# Patient Record
Sex: FEMALE | Race: WHITE | NOT HISPANIC OR LATINO | URBAN - METROPOLITAN AREA
[De-identification: names, ages, dates, MRNs, and addresses within clinical notes are randomized per-mention and may not be internally consistent; named-entity substitution may affect disease eponyms.]

---

## 2017-06-26 ENCOUNTER — EMERGENCY (EMERGENCY)
Facility: HOSPITAL | Age: 77
LOS: 1 days | Discharge: HOME | End: 2017-06-26
Attending: EMERGENCY MEDICINE | Admitting: EMERGENCY MEDICINE
Payer: MEDICARE

## 2017-06-26 VITALS
TEMPERATURE: 98 F | HEART RATE: 67 BPM | OXYGEN SATURATION: 98 % | DIASTOLIC BLOOD PRESSURE: 78 MMHG | SYSTOLIC BLOOD PRESSURE: 178 MMHG | WEIGHT: 169.98 LBS | RESPIRATION RATE: 11 BRPM

## 2017-06-26 VITALS — SYSTOLIC BLOOD PRESSURE: 177 MMHG | DIASTOLIC BLOOD PRESSURE: 83 MMHG

## 2017-06-26 DIAGNOSIS — F17.200 NICOTINE DEPENDENCE, UNSPECIFIED, UNCOMPLICATED: ICD-10-CM

## 2017-06-26 DIAGNOSIS — Z88.5 ALLERGY STATUS TO NARCOTIC AGENT: ICD-10-CM

## 2017-06-26 DIAGNOSIS — I10 ESSENTIAL (PRIMARY) HYPERTENSION: ICD-10-CM

## 2017-06-26 DIAGNOSIS — Z79.84 LONG TERM (CURRENT) USE OF ORAL HYPOGLYCEMIC DRUGS: ICD-10-CM

## 2017-06-26 DIAGNOSIS — E11.9 TYPE 2 DIABETES MELLITUS WITHOUT COMPLICATIONS: ICD-10-CM

## 2017-06-26 DIAGNOSIS — E78.5 HYPERLIPIDEMIA, UNSPECIFIED: ICD-10-CM

## 2017-06-26 PROCEDURE — 99283 EMERGENCY DEPT VISIT LOW MDM: CPT

## 2017-06-26 PROCEDURE — 99282 EMERGENCY DEPT VISIT SF MDM: CPT

## 2017-06-26 RX ORDER — LOSARTAN POTASSIUM 100 MG/1
1 TABLET, FILM COATED ORAL
Qty: 0 | Refills: 0 | COMMUNITY

## 2017-06-26 NOTE — ED PROVIDER NOTE - MEDICAL DECISION MAKING DETAILS
75yo female with hypertension, supportive care, follow up tomorrow with pmd, educate about taking bp and to stop smoking

## 2017-06-26 NOTE — ED ADULT NURSE NOTE - OBJECTIVE STATEMENT
received pt in FT Pt A&O states BP was high @ home Spoke to Dr Garcia & was instructed to take an additional dose of BP medication Pt w/ no complaints Pt seen by Dr Huynh Pt d/c'd per order

## 2017-06-26 NOTE — ED PROVIDER NOTE - OBJECTIVE STATEMENT
77yo female who presents with elevated BP. pt states she has not had change in her meds, and today felt pressure in her head, BP was up to 190, she spoke to her pmd who told her to take an extra cozaar and her bp came down to 170, she was seen at [facility] and told to come to the ER. pt denies headache, dizziness, chest pain no other copmlaints

## 2017-06-29 ENCOUNTER — EMERGENCY (EMERGENCY)
Facility: HOSPITAL | Age: 77
LOS: 1 days | Discharge: HOME | End: 2017-06-29
Attending: EMERGENCY MEDICINE | Admitting: EMERGENCY MEDICINE
Payer: MEDICARE

## 2017-06-29 VITALS
RESPIRATION RATE: 16 BRPM | OXYGEN SATURATION: 99 % | WEIGHT: 125 LBS | SYSTOLIC BLOOD PRESSURE: 182 MMHG | HEART RATE: 66 BPM | TEMPERATURE: 98 F | DIASTOLIC BLOOD PRESSURE: 82 MMHG

## 2017-06-29 VITALS
DIASTOLIC BLOOD PRESSURE: 82 MMHG | RESPIRATION RATE: 15 BRPM | SYSTOLIC BLOOD PRESSURE: 160 MMHG | HEART RATE: 66 BPM | OXYGEN SATURATION: 98 %

## 2017-06-29 DIAGNOSIS — I16.0 HYPERTENSIVE URGENCY: ICD-10-CM

## 2017-06-29 DIAGNOSIS — I10 ESSENTIAL (PRIMARY) HYPERTENSION: ICD-10-CM

## 2017-06-29 DIAGNOSIS — Z91.041 RADIOGRAPHIC DYE ALLERGY STATUS: ICD-10-CM

## 2017-06-29 DIAGNOSIS — E11.9 TYPE 2 DIABETES MELLITUS WITHOUT COMPLICATIONS: ICD-10-CM

## 2017-06-29 DIAGNOSIS — Z88.5 ALLERGY STATUS TO NARCOTIC AGENT: ICD-10-CM

## 2017-06-29 DIAGNOSIS — E78.5 HYPERLIPIDEMIA, UNSPECIFIED: ICD-10-CM

## 2017-06-29 DIAGNOSIS — F17.200 NICOTINE DEPENDENCE, UNSPECIFIED, UNCOMPLICATED: ICD-10-CM

## 2017-06-29 DIAGNOSIS — Z79.4 LONG TERM (CURRENT) USE OF INSULIN: ICD-10-CM

## 2017-06-29 LAB
ALBUMIN SERPL ELPH-MCNC: 3.4 G/DL — SIGNIFICANT CHANGE UP (ref 3.3–5)
ALP SERPL-CCNC: 81 U/L — SIGNIFICANT CHANGE UP (ref 40–120)
ALT FLD-CCNC: 24 U/L — SIGNIFICANT CHANGE UP (ref 12–78)
ANION GAP SERPL CALC-SCNC: 7 MMOL/L — SIGNIFICANT CHANGE UP (ref 5–17)
AST SERPL-CCNC: 19 U/L — SIGNIFICANT CHANGE UP (ref 15–37)
BASOPHILS # BLD AUTO: 0.1 K/UL — SIGNIFICANT CHANGE UP (ref 0–0.2)
BASOPHILS NFR BLD AUTO: 0.8 % — SIGNIFICANT CHANGE UP (ref 0–2)
BILIRUB SERPL-MCNC: 0.4 MG/DL — SIGNIFICANT CHANGE UP (ref 0.2–1.2)
BUN SERPL-MCNC: 12 MG/DL — SIGNIFICANT CHANGE UP (ref 7–23)
CALCIUM SERPL-MCNC: 9 MG/DL — SIGNIFICANT CHANGE UP (ref 8.5–10.1)
CHLORIDE SERPL-SCNC: 99 MMOL/L — SIGNIFICANT CHANGE UP (ref 96–108)
CK MB BLD-MCNC: 1.2 % — SIGNIFICANT CHANGE UP (ref 0–3.5)
CK MB CFR SERPL CALC: 0.8 NG/ML — SIGNIFICANT CHANGE UP (ref 0–3.6)
CK SERPL-CCNC: 67 U/L — SIGNIFICANT CHANGE UP (ref 26–192)
CO2 SERPL-SCNC: 31 MMOL/L — SIGNIFICANT CHANGE UP (ref 22–31)
CREAT SERPL-MCNC: 0.62 MG/DL — SIGNIFICANT CHANGE UP (ref 0.5–1.3)
EOSINOPHIL # BLD AUTO: 0.3 K/UL — SIGNIFICANT CHANGE UP (ref 0–0.5)
EOSINOPHIL NFR BLD AUTO: 2.3 % — SIGNIFICANT CHANGE UP (ref 0–6)
GLUCOSE SERPL-MCNC: 208 MG/DL — HIGH (ref 70–99)
HCT VFR BLD CALC: 40.9 % — SIGNIFICANT CHANGE UP (ref 34.5–45)
HGB BLD-MCNC: 13.4 G/DL — SIGNIFICANT CHANGE UP (ref 11.5–15.5)
LYMPHOCYTES # BLD AUTO: 1.8 K/UL — SIGNIFICANT CHANGE UP (ref 1–3.3)
LYMPHOCYTES # BLD AUTO: 14.7 % — SIGNIFICANT CHANGE UP (ref 13–44)
MCHC RBC-ENTMCNC: 27.7 PG — SIGNIFICANT CHANGE UP (ref 27–34)
MCHC RBC-ENTMCNC: 32.8 GM/DL — SIGNIFICANT CHANGE UP (ref 32–36)
MCV RBC AUTO: 84.5 FL — SIGNIFICANT CHANGE UP (ref 80–100)
MONOCYTES # BLD AUTO: 1.3 K/UL — HIGH (ref 0–0.9)
MONOCYTES NFR BLD AUTO: 10.2 % — HIGH (ref 1–9)
NEUTROPHILS # BLD AUTO: 8.9 K/UL — HIGH (ref 1.8–7.4)
NEUTROPHILS NFR BLD AUTO: 72 % — SIGNIFICANT CHANGE UP (ref 43–77)
PLATELET # BLD AUTO: 396 K/UL — SIGNIFICANT CHANGE UP (ref 150–400)
POTASSIUM SERPL-MCNC: 4.3 MMOL/L — SIGNIFICANT CHANGE UP (ref 3.5–5.3)
POTASSIUM SERPL-SCNC: 4.3 MMOL/L — SIGNIFICANT CHANGE UP (ref 3.5–5.3)
PROT SERPL-MCNC: 7.6 G/DL — SIGNIFICANT CHANGE UP (ref 6–8.3)
RBC # BLD: 4.84 M/UL — SIGNIFICANT CHANGE UP (ref 3.8–5.2)
RBC # FLD: 13 % — SIGNIFICANT CHANGE UP (ref 10.3–14.5)
SODIUM SERPL-SCNC: 137 MMOL/L — SIGNIFICANT CHANGE UP (ref 135–145)
TROPONIN I SERPL-MCNC: <.015 NG/ML — SIGNIFICANT CHANGE UP (ref 0.01–0.04)
WBC # BLD: 12.4 K/UL — HIGH (ref 3.8–10.5)
WBC # FLD AUTO: 12.4 K/UL — HIGH (ref 3.8–10.5)

## 2017-06-29 PROCEDURE — 82553 CREATINE MB FRACTION: CPT

## 2017-06-29 PROCEDURE — 70450 CT HEAD/BRAIN W/O DYE: CPT | Mod: 26

## 2017-06-29 PROCEDURE — 99284 EMERGENCY DEPT VISIT MOD MDM: CPT | Mod: 25

## 2017-06-29 PROCEDURE — 36415 COLL VENOUS BLD VENIPUNCTURE: CPT

## 2017-06-29 PROCEDURE — 70450 CT HEAD/BRAIN W/O DYE: CPT

## 2017-06-29 PROCEDURE — 84484 ASSAY OF TROPONIN QUANT: CPT

## 2017-06-29 PROCEDURE — 93005 ELECTROCARDIOGRAM TRACING: CPT

## 2017-06-29 PROCEDURE — 82550 ASSAY OF CK (CPK): CPT

## 2017-06-29 PROCEDURE — 80053 COMPREHEN METABOLIC PANEL: CPT

## 2017-06-29 PROCEDURE — 85027 COMPLETE CBC AUTOMATED: CPT

## 2017-06-29 PROCEDURE — 99285 EMERGENCY DEPT VISIT HI MDM: CPT | Mod: 25

## 2017-06-29 RX ORDER — METFORMIN HYDROCHLORIDE 850 MG/1
1 TABLET ORAL
Qty: 0 | Refills: 0 | COMMUNITY

## 2017-06-29 RX ORDER — SODIUM CHLORIDE 9 MG/ML
3 INJECTION INTRAMUSCULAR; INTRAVENOUS; SUBCUTANEOUS ONCE
Qty: 0 | Refills: 0 | Status: COMPLETED | OUTPATIENT
Start: 2017-06-29 | End: 2017-06-29

## 2017-06-29 RX ORDER — RANITIDINE HYDROCHLORIDE 150 MG/1
0 TABLET, FILM COATED ORAL
Qty: 0 | Refills: 0 | COMMUNITY

## 2017-06-29 RX ORDER — LANSOPRAZOLE 15 MG/1
1 CAPSULE, DELAYED RELEASE ORAL
Qty: 0 | Refills: 0 | COMMUNITY

## 2017-06-29 RX ORDER — DIGOXIN 250 MCG
1 TABLET ORAL
Qty: 0 | Refills: 0 | COMMUNITY

## 2017-06-29 RX ORDER — ASPIRIN/CALCIUM CARB/MAGNESIUM 324 MG
325 TABLET ORAL ONCE
Qty: 0 | Refills: 0 | Status: COMPLETED | OUTPATIENT
Start: 2017-06-29 | End: 2017-06-29

## 2017-06-29 RX ORDER — PERPHENAZINE/AMITRIPTYLINE HCL 2 MG-10 MG
0 TABLET ORAL
Qty: 0 | Refills: 0 | COMMUNITY

## 2017-06-29 RX ORDER — ATORVASTATIN CALCIUM 80 MG/1
1 TABLET, FILM COATED ORAL
Qty: 0 | Refills: 0 | COMMUNITY

## 2017-06-29 RX ADMIN — Medication 325 MILLIGRAM(S): at 02:39

## 2017-06-29 RX ADMIN — SODIUM CHLORIDE 3 MILLILITER(S): 9 INJECTION INTRAMUSCULAR; INTRAVENOUS; SUBCUTANEOUS at 02:29

## 2017-06-29 NOTE — ED ADULT NURSE NOTE - CHPI ED SYMPTOMS NEG
no diaphoresis/no syncope/no nausea/no cough/no fever/no dizziness/no shortness of breath/no vomiting/no chills/no back pain/no chest pain

## 2017-06-29 NOTE — ED PROVIDER NOTE - OBJECTIVE STATEMENT
77yo female who presents with elevated BP. pt was seen here 2 days ago, advised to take her cozaar twice a day consistently, she followed up with her pmd, and her bp started to creep up, up to 180, she only then took her cozaar at 1130 and her bp went up to 210, pt c/o pressure in her head, with dizziness, jaw pain which has now resolved, no chest pain or sob, no other copmalints

## 2017-06-29 NOTE — ED ADULT NURSE NOTE - OBJECTIVE STATEMENT
76 year old female presents to the ED with complaints of high blood pressure, 212/94 at home. Patient was seen in ED 2 days ago for high blood pressure, advised to take cozaar daily medication twice a day. Patient followed up as recommended with PMD. Today patient noted high blood pressure, systolic 180. Patient took second daily dose of cozaar as prescribed around 1130 pm. At 0130 am patient noted 212/94. Patient complains of headache and dizziness. Patient had jaw pain, since resolved. Patient denies chest pain/shortness of breath.

## 2017-06-29 NOTE — ED ADULT TRIAGE NOTE - CHIEF COMPLAINT QUOTE
took Bp- 212/94mmhg at 0130am- has complains of dizziness and jaw pain since 10pm- denies any chest pain

## 2020-07-29 PROBLEM — E11.9 TYPE 2 DIABETES MELLITUS WITHOUT COMPLICATIONS: Chronic | Status: ACTIVE | Noted: 2017-06-26

## 2020-07-29 PROBLEM — E78.5 HYPERLIPIDEMIA, UNSPECIFIED: Chronic | Status: ACTIVE | Noted: 2017-06-26

## 2020-07-29 PROBLEM — I10 ESSENTIAL (PRIMARY) HYPERTENSION: Chronic | Status: ACTIVE | Noted: 2017-06-26

## 2020-08-03 ENCOUNTER — APPOINTMENT (OUTPATIENT)
Dept: INTERNAL MEDICINE | Facility: CLINIC | Age: 80
End: 2020-08-03
Payer: MEDICARE

## 2020-08-03 VITALS
WEIGHT: 127.19 LBS | BODY MASS INDEX: 24.97 KG/M2 | HEART RATE: 65 BPM | HEIGHT: 60 IN | TEMPERATURE: 97.6 F | OXYGEN SATURATION: 95 % | RESPIRATION RATE: 12 BRPM | DIASTOLIC BLOOD PRESSURE: 65 MMHG | SYSTOLIC BLOOD PRESSURE: 145 MMHG

## 2020-08-03 DIAGNOSIS — F32.9 MAJOR DEPRESSIVE DISORDER, SINGLE EPISODE, UNSPECIFIED: ICD-10-CM

## 2020-08-03 PROCEDURE — 99204 OFFICE O/P NEW MOD 45 MIN: CPT | Mod: 25

## 2020-08-03 PROCEDURE — 36415 COLL VENOUS BLD VENIPUNCTURE: CPT

## 2020-08-03 RX ORDER — DIGOXIN 250 UG/1
250 TABLET ORAL
Qty: 90 | Refills: 0 | Status: DISCONTINUED | COMMUNITY
Start: 2020-06-10 | End: 2020-08-03

## 2020-08-03 RX ORDER — FLASH GLUCOSE SCANNING READER
EACH MISCELLANEOUS
Qty: 1 | Refills: 0 | Status: ACTIVE | COMMUNITY
Start: 2020-05-14

## 2020-08-03 RX ORDER — FLASH GLUCOSE SENSOR
KIT MISCELLANEOUS
Qty: 6 | Refills: 0 | Status: ACTIVE | COMMUNITY
Start: 2020-07-12

## 2020-08-03 NOTE — HISTORY OF PRESENT ILLNESS
[FreeTextEntry1] : ROUTINE F/U  [de-identified] : RECENTLY SAW CARDIO , DR ELKINS AND HAD CT ANGIO AT DR MCKINNON .. DENIES ANY EXERTIIONAL CHEST PAINS , DYSPNEA ON EXERTION ,  STILL SMOKING APPROXIMATELY IPPD , NO NEW COUGH

## 2020-08-03 NOTE — PHYSICAL EXAM
[Thyroid Normal, No Nodules] : the thyroid was normal and there were no nodules present [No Lymphadenopathy] : no lymphadenopathy [No Carotid Bruits] : no carotid bruits [Normal] : soft, non-tender, non-distended, no masses palpated, no HSM and normal bowel sounds

## 2020-08-04 LAB
ALBUMIN SERPL ELPH-MCNC: 4.6 G/DL
ALP BLD-CCNC: 54 U/L
ALT SERPL-CCNC: 18 U/L
ANION GAP SERPL CALC-SCNC: 15 MMOL/L
AST SERPL-CCNC: 19 U/L
BASOPHILS # BLD AUTO: 0.07 K/UL
BASOPHILS NFR BLD AUTO: 0.7 %
BILIRUB SERPL-MCNC: 0.5 MG/DL
BUN SERPL-MCNC: 20 MG/DL
CALCIUM SERPL-MCNC: 9.8 MG/DL
CHLORIDE SERPL-SCNC: 103 MMOL/L
CHOLEST SERPL-MCNC: 164 MG/DL
CHOLEST/HDLC SERPL: 4.3 RATIO
CO2 SERPL-SCNC: 23 MMOL/L
CREAT SERPL-MCNC: 0.62 MG/DL
EOSINOPHIL # BLD AUTO: 0.33 K/UL
EOSINOPHIL NFR BLD AUTO: 3.3 %
ESTIMATED AVERAGE GLUCOSE: 177 MG/DL
GLUCOSE SERPL-MCNC: 179 MG/DL
GLUCOSE SERPL-MCNC: 181 MG/DL
HBA1C MFR BLD HPLC: 7.8 %
HCT VFR BLD CALC: 45 %
HDLC SERPL-MCNC: 38 MG/DL
HGB BLD-MCNC: 14.2 G/DL
IMM GRANULOCYTES NFR BLD AUTO: 0.4 %
LDLC SERPL CALC-MCNC: 98 MG/DL
LYMPHOCYTES # BLD AUTO: 1.49 K/UL
LYMPHOCYTES NFR BLD AUTO: 14.7 %
MAN DIFF?: NORMAL
MCHC RBC-ENTMCNC: 28.7 PG
MCHC RBC-ENTMCNC: 31.6 GM/DL
MCV RBC AUTO: 91.1 FL
MONOCYTES # BLD AUTO: 0.82 K/UL
MONOCYTES NFR BLD AUTO: 8.1 %
NEUTROPHILS # BLD AUTO: 7.36 K/UL
NEUTROPHILS NFR BLD AUTO: 72.8 %
PLATELET # BLD AUTO: 320 K/UL
POTASSIUM SERPL-SCNC: 5 MMOL/L
PROT SERPL-MCNC: 6.9 G/DL
RBC # BLD: 4.94 M/UL
RBC # FLD: 13.1 %
SODIUM SERPL-SCNC: 141 MMOL/L
TRIGL SERPL-MCNC: 139 MG/DL
WBC # FLD AUTO: 10.11 K/UL

## 2020-08-27 ENCOUNTER — APPOINTMENT (OUTPATIENT)
Dept: INTERNAL MEDICINE | Facility: CLINIC | Age: 80
End: 2020-08-27
Payer: MEDICARE

## 2020-08-27 VITALS
HEIGHT: 60 IN | OXYGEN SATURATION: 94 % | BODY MASS INDEX: 25.32 KG/M2 | WEIGHT: 129 LBS | HEART RATE: 71 BPM | TEMPERATURE: 98 F | SYSTOLIC BLOOD PRESSURE: 121 MMHG | DIASTOLIC BLOOD PRESSURE: 66 MMHG

## 2020-08-27 PROCEDURE — 99213 OFFICE O/P EST LOW 20 MIN: CPT

## 2020-08-27 RX ORDER — DOXYCYCLINE HYCLATE 50 MG/1
50 CAPSULE ORAL
Qty: 30 | Refills: 0 | Status: DISCONTINUED | COMMUNITY
Start: 2020-05-18 | End: 2020-08-27

## 2020-08-27 RX ORDER — AZITHROMYCIN 250 MG/1
250 TABLET, FILM COATED ORAL
Qty: 1 | Refills: 0 | Status: DISCONTINUED | COMMUNITY
Start: 2020-08-27 | End: 2020-08-27

## 2020-08-31 LAB — SARS-COV-2 N GENE NPH QL NAA+PROBE: NOT DETECTED

## 2020-09-03 ENCOUNTER — APPOINTMENT (OUTPATIENT)
Dept: INTERNAL MEDICINE | Facility: CLINIC | Age: 80
End: 2020-09-03
Payer: MEDICARE

## 2020-09-03 VITALS
TEMPERATURE: 98.1 F | RESPIRATION RATE: 12 BRPM | BODY MASS INDEX: 25.23 KG/M2 | SYSTOLIC BLOOD PRESSURE: 131 MMHG | OXYGEN SATURATION: 95 % | HEIGHT: 60 IN | HEART RATE: 70 BPM | WEIGHT: 128.5 LBS | DIASTOLIC BLOOD PRESSURE: 81 MMHG

## 2020-09-03 PROCEDURE — 99214 OFFICE O/P EST MOD 30 MIN: CPT

## 2020-09-03 NOTE — REVIEW OF SYSTEMS
[Orthopnea] : orthopnea [Nasal Discharge] : no nasal discharge [Fever] : no fever [Chills] : no chills [Sore Throat] : no sore throat [Chest Pain] : no chest pain [Shortness Of Breath] : no shortness of breath [Lower Ext Edema] : no lower extremity edema [Palpitations] : no palpitations [Cough] : no cough [Wheezing] : no wheezing [Abdominal Pain] : no abdominal pain [Vomiting] : no vomiting [Diarrhea] : diarrhea [Nausea] : no nausea [Muscle Pain] : no muscle pain

## 2020-09-03 NOTE — PHYSICAL EXAM
[No Acute Distress] : no acute distress [Well Nourished] : well nourished [Well Developed] : well developed [No JVD] : no jugular venous distention [No Respiratory Distress] : no respiratory distress  [No Accessory Muscle Use] : no accessory muscle use [Normal Rate] : normal rate  [Regular Rhythm] : with a regular rhythm [Normal S1, S2] : normal S1 and S2 [Soft] : abdomen soft [Non Tender] : non-tender [Non-distended] : non-distended [No HSM] : no HSM [Normal Bowel Sounds] : normal bowel sounds [de-identified] : Trace LE edema [de-identified] : Wheezes in upper lung fields. Diminished lung sounds at bilateral bases.

## 2020-09-03 NOTE — PLAN
[FreeTextEntry1] : \par ADDENDUM: CXR without effusions, edema, or focal infiltrate. Will treat with 40mg PO prednisone x5 days for presumed COPD exacerbation. Will return Monday for re-evaluation.

## 2020-09-03 NOTE — HISTORY OF PRESENT ILLNESS
[de-identified] : Kiarra re-presents today for continued shortness of breath and cough. She was seen last week for similar symptoms and given a Zpack. She said she improved initially for a two days but then worsened again. She reports ongoing shortness of breath and cough. She has cough with productive yellow, green sputum. She does not have fevers/chills, abdominal pain, or myalgias. She does report orthopnea- noting she has difficulty laying flat due to shortness of breath. She denies chest pain.

## 2020-09-03 NOTE — ASSESSMENT
[FreeTextEntry1] : \par 1. Shortness of breath and cough\par Refer for CXR\par Likely will treat as COPD exacerbation with 40mg prednisone x5 days\par If grossly overloaded, then will have to give furosemide to diurese.\par \par

## 2020-09-08 ENCOUNTER — APPOINTMENT (OUTPATIENT)
Dept: INTERNAL MEDICINE | Facility: CLINIC | Age: 80
End: 2020-09-08
Payer: MEDICARE

## 2020-09-08 VITALS
RESPIRATION RATE: 12 BRPM | OXYGEN SATURATION: 97 % | WEIGHT: 128.5 LBS | DIASTOLIC BLOOD PRESSURE: 72 MMHG | SYSTOLIC BLOOD PRESSURE: 131 MMHG | HEIGHT: 60 IN | BODY MASS INDEX: 25.23 KG/M2 | HEART RATE: 64 BPM

## 2020-09-08 PROCEDURE — 99213 OFFICE O/P EST LOW 20 MIN: CPT | Mod: 25

## 2020-09-08 PROCEDURE — 99406 BEHAV CHNG SMOKING 3-10 MIN: CPT

## 2020-09-08 RX ORDER — PREDNISONE 20 MG/1
20 TABLET ORAL DAILY
Qty: 10 | Refills: 0 | Status: DISCONTINUED | COMMUNITY
Start: 2020-09-03 | End: 2020-09-08

## 2020-09-08 NOTE — PHYSICAL EXAM
[Well Nourished] : well nourished [No Acute Distress] : no acute distress [Well Developed] : well developed [No JVD] : no jugular venous distention [No Respiratory Distress] : no respiratory distress  [No Accessory Muscle Use] : no accessory muscle use [Clear to Auscultation] : lungs were clear to auscultation bilaterally [Normal Rate] : normal rate  [Regular Rhythm] : with a regular rhythm [Normal S1, S2] : normal S1 and S2 [Soft] : abdomen soft [Non Tender] : non-tender [Non-distended] : non-distended [No HSM] : no HSM [Normal Bowel Sounds] : normal bowel sounds [de-identified] : Trace LE edema.

## 2020-09-08 NOTE — ASSESSMENT
[FreeTextEntry1] : \par 1. COPD exacerbation, resolving\par Advised to continue inhaler use as prescribed.\par Advised to call if sugars are not improved now that off Prednisone. \par Recommend she follow up with Pulmonary (Dr Esteban Diaz) and she has scheduled an appointment for tomorrow.\par Recommend she quit smoking. She has not smoked in ten days given worsening of her symptoms and I strongly recommend she not re-start smoking. Education provided on how smoking is detrimental to lung health.\par She declined flu vaccine.

## 2020-09-08 NOTE — HISTORY OF PRESENT ILLNESS
[FreeTextEntry1] : follow up [de-identified] : Kiarra presents for follow up today.\par \par She was seen twice recently for persistent cough and shortness of breath. She was treated first with a Zpack and then with course of prednisone. She finished 5 days of 40mg prednisone yesterday. She is feeling much better and has improvement in symptoms. She no fevers/chills and cough and shortness of breath have improved. She still has thick sputum production but denies hematemesis. Her sugars were elevated with the prednisone.

## 2020-09-08 NOTE — REVIEW OF SYSTEMS
[Shortness Of Breath] : shortness of breath [Dyspnea on Exertion] : dyspnea on exertion [Wheezing] : wheezing [Cough] : cough [Fever] : no fever [Chills] : no chills [Night Sweats] : no night sweats [Chest Pain] : no chest pain [Orthopnea] : no orthopnea

## 2020-10-07 ENCOUNTER — RX RENEWAL (OUTPATIENT)
Age: 80
End: 2020-10-07

## 2020-10-09 ENCOUNTER — NON-APPOINTMENT (OUTPATIENT)
Age: 80
End: 2020-10-09

## 2020-10-09 ENCOUNTER — APPOINTMENT (OUTPATIENT)
Dept: INTERNAL MEDICINE | Facility: CLINIC | Age: 80
End: 2020-10-09
Payer: MEDICARE

## 2020-10-09 VITALS
TEMPERATURE: 98.1 F | HEART RATE: 76 BPM | SYSTOLIC BLOOD PRESSURE: 130 MMHG | BODY MASS INDEX: 25 KG/M2 | OXYGEN SATURATION: 98 % | DIASTOLIC BLOOD PRESSURE: 76 MMHG | WEIGHT: 128 LBS

## 2020-10-09 DIAGNOSIS — Z87.42 PERSONAL HISTORY OF OTHER DISEASES OF THE FEMALE GENITAL TRACT: ICD-10-CM

## 2020-10-09 PROCEDURE — 99214 OFFICE O/P EST MOD 30 MIN: CPT | Mod: 25

## 2020-10-09 PROCEDURE — 36415 COLL VENOUS BLD VENIPUNCTURE: CPT

## 2020-10-09 PROCEDURE — 93000 ELECTROCARDIOGRAM COMPLETE: CPT

## 2020-10-09 NOTE — REVIEW OF SYSTEMS
[Fever] : no fever [Chills] : no chills [Chest Pain] : chest pain [Palpitations] : no palpitations [Lower Ext Edema] : no lower extremity edema [Shortness Of Breath] : no shortness of breath [Abdominal Pain] : no abdominal pain [Nausea] : no nausea [Vomiting] : no vomiting [Vaginal Discharge] : no vaginal discharge

## 2020-10-09 NOTE — PHYSICAL EXAM
[No Acute Distress] : no acute distress [Well Nourished] : well nourished [Well Developed] : well developed [No JVD] : no jugular venous distention [No Respiratory Distress] : no respiratory distress  [No Accessory Muscle Use] : no accessory muscle use [Clear to Auscultation] : lungs were clear to auscultation bilaterally [Normal Rate] : normal rate  [Regular Rhythm] : with a regular rhythm [Normal S1, S2] : normal S1 and S2 [No Carotid Bruits] : no carotid bruits [No Edema] : there was no peripheral edema [Soft] : abdomen soft [Non Tender] : non-tender [Non-distended] : non-distended [Normal Bowel Sounds] : normal bowel sounds [de-identified] : Labia majora and minora inflamed

## 2020-10-09 NOTE — HISTORY OF PRESENT ILLNESS
[FreeTextEntry1] : chest pain, yeast infection [de-identified] : Kiarra Oliva presents today for follow up.\par \par 1. Chest pain\par She has been feeling sharp, shooting pain in L chest. Pain is short lived and usually resolves within seconds. No association with exertion or eating. No dizziness/lightheadedness, palpitations, or worsening shortness of breath. She last saw Dr. Carrington in May when she had normal nuclear stress. She does have CAD. \par \par 2. T2DM\par Most fasting sugars in the 140s (sees Dr Perlman).\par \par 3. Yeast infection\par Had been using cortisone for itch.

## 2020-10-09 NOTE — ASSESSMENT
[FreeTextEntry1] : 1. Chest pain\par EKG unchanged\par Re check lipids\par Re-refer back to cardiology should she need ECHO or expedited eval\par Will send labs to Dr. Carrington\par \par 2. T2DM\par Check A1c\par \par 3. Vulvovaginitis\par Monistat 3- suppository and topical cream

## 2020-10-12 ENCOUNTER — RX RENEWAL (OUTPATIENT)
Age: 80
End: 2020-10-12

## 2020-10-14 LAB
ALBUMIN SERPL ELPH-MCNC: 4.4 G/DL
ALP BLD-CCNC: 46 U/L
ALT SERPL-CCNC: 17 U/L
ANION GAP SERPL CALC-SCNC: 12 MMOL/L
AST SERPL-CCNC: 19 U/L
BASOPHILS # BLD AUTO: 0.04 K/UL
BASOPHILS NFR BLD AUTO: 0.4 %
BILIRUB SERPL-MCNC: 0.7 MG/DL
BUN SERPL-MCNC: 11 MG/DL
CALCIUM SERPL-MCNC: 9.5 MG/DL
CHLORIDE SERPL-SCNC: 99 MMOL/L
CHOLEST SERPL-MCNC: 138 MG/DL
CHOLEST/HDLC SERPL: 3.4 RATIO
CO2 SERPL-SCNC: 24 MMOL/L
CREAT SERPL-MCNC: 0.63 MG/DL
EOSINOPHIL # BLD AUTO: 0.16 K/UL
EOSINOPHIL NFR BLD AUTO: 1.6 %
ESTIMATED AVERAGE GLUCOSE: 177 MG/DL
GLUCOSE SERPL-MCNC: 132 MG/DL
HBA1C MFR BLD HPLC: 7.8 %
HCT VFR BLD CALC: 42 %
HDLC SERPL-MCNC: 41 MG/DL
HGB BLD-MCNC: 14 G/DL
IMM GRANULOCYTES NFR BLD AUTO: 0.8 %
LDLC SERPL CALC-MCNC: 78 MG/DL
LYMPHOCYTES # BLD AUTO: 1.62 K/UL
LYMPHOCYTES NFR BLD AUTO: 16.3 %
MAN DIFF?: NORMAL
MCHC RBC-ENTMCNC: 29.7 PG
MCHC RBC-ENTMCNC: 33.3 GM/DL
MCV RBC AUTO: 89 FL
MONOCYTES # BLD AUTO: 0.87 K/UL
MONOCYTES NFR BLD AUTO: 8.8 %
NEUTROPHILS # BLD AUTO: 7.17 K/UL
NEUTROPHILS NFR BLD AUTO: 72.1 %
PLATELET # BLD AUTO: 335 K/UL
POTASSIUM SERPL-SCNC: 4.4 MMOL/L
PROT SERPL-MCNC: 6.6 G/DL
RBC # BLD: 4.72 M/UL
RBC # FLD: 13.7 %
SODIUM SERPL-SCNC: 135 MMOL/L
TRIGL SERPL-MCNC: 96 MG/DL
WBC # FLD AUTO: 9.94 K/UL

## 2020-10-27 ENCOUNTER — APPOINTMENT (OUTPATIENT)
Dept: INTERNAL MEDICINE | Facility: CLINIC | Age: 80
End: 2020-10-27
Payer: MEDICARE

## 2020-10-27 VITALS
HEIGHT: 60 IN | HEART RATE: 71 BPM | DIASTOLIC BLOOD PRESSURE: 77 MMHG | RESPIRATION RATE: 12 BRPM | TEMPERATURE: 97.9 F | SYSTOLIC BLOOD PRESSURE: 129 MMHG | BODY MASS INDEX: 25.76 KG/M2 | WEIGHT: 131.19 LBS | OXYGEN SATURATION: 96 %

## 2020-10-27 DIAGNOSIS — Z01.818 ENCOUNTER FOR OTHER PREPROCEDURAL EXAMINATION: ICD-10-CM

## 2020-10-27 PROCEDURE — 99072 ADDL SUPL MATRL&STAF TM PHE: CPT

## 2020-10-27 PROCEDURE — 99214 OFFICE O/P EST MOD 30 MIN: CPT | Mod: 25

## 2020-10-27 NOTE — PHYSICAL EXAM
[No Acute Distress] : no acute distress [Well Nourished] : well nourished [Well Developed] : well developed [Well-Appearing] : well-appearing [Normal Sclera/Conjunctiva] : normal sclera/conjunctiva [No JVD] : no jugular venous distention [No Respiratory Distress] : no respiratory distress  [No Accessory Muscle Use] : no accessory muscle use [Clear to Auscultation] : lungs were clear to auscultation bilaterally [Normal Rate] : normal rate  [Regular Rhythm] : with a regular rhythm [Normal S1, S2] : normal S1 and S2 [No Edema] : there was no peripheral edema [Soft] : abdomen soft [Non Tender] : non-tender [Non-distended] : non-distended [Normal Bowel Sounds] : normal bowel sounds [Normal Gait] : normal gait [Alert and Oriented x3] : oriented to person, place, and time

## 2020-10-29 NOTE — ASSESSMENT
[Patient Optimized for Surgery] : Patient optimized for surgery [Continue anti-platelet treatment as is] : Continue current anti-platelet treatment [Modify medications prior to procedure] : Modify medications prior to procedure [FreeTextEntry4] : RCRI: 1point\par [place]: 0.8 to 1%\par \par Her EKG from her cardiologist earlier this month showed RBBB. \par \par Kiarra is moderate risk for this moderate risk procedure. She has already been seen by her Cardiologist and this was felt to be necessary testing to evaluate her symptoms. \par \par I have advised her to ensure that she uses her inhalers to ensure her respiratory status. She should respiratory support (supplemental O2, albuterol prn) available should she develop wheezing or bronchospasm.\par \par She does have allergy to iodinate contrast media and reports that she has already discussed this with Cardiology team with plans for pre-medication.\par \par She is having pre-surgical testing on 10/27/20. CBC and CMP from earlier in October were acceptable. \par \par I will send addendum after review of her labwork. Pending that it is normal, she is optimized for angiography.  [FreeTextEntry6] : Continue ASA [FreeTextEntry7] : Advised to hold metformin and tradjenta on morning of procedure.

## 2020-10-29 NOTE — HISTORY OF PRESENT ILLNESS
[Coronary Artery Disease] : coronary artery disease [Asthma] : asthma [COPD] : COPD [Smoker] : smoker [Diabetes] : diabetes [Moderate (4-6 METs)] : Moderate (4-6 METs) [Anti-Platelet Agents: _____] : Anti-Platelet Agents: [unfilled] [Family Member] : no family member with adverse anesthesia reaction/sudden death [Self] : no previous adverse anesthesia reaction [FreeTextEntry1] : L and R cardiac catheterization  [FreeTextEntry2] : 10/30/20 [FreeTextEntry3] : Dr. Simon  [FreeTextEntry4] : Kiarra Oliva presents today for pre-operative risk assessment for planned cardiac catheterization. She is undergoing angiogram for evaluation of chest heaviness/pressure and shortness of breath. She otherwise feels well and has recovered from recent bout of COPD exacerbation. She currently is asymptomatic but understand angiography is next step in this evaluation. [FreeTextEntry6] : Chest heaviness, pressure\par Shortness of breath [FreeTextEntry7] : See attached from her Cardiologist  [FreeTextEntry8] : Likely around 4 mets

## 2020-10-29 NOTE — ADDENDUM
[FreeTextEntry1] : Labs from PST at [facility] Reviewed. No change in recommendations.

## 2020-10-29 NOTE — REVIEW OF SYSTEMS
[Cough] : cough [Fever] : no fever [Chills] : no chills [Night Sweats] : no night sweats [Chest Pain] : no chest pain [Palpitations] : no palpitations [Shortness Of Breath] : no shortness of breath [Wheezing] : no wheezing [Abdominal Pain] : no abdominal pain [Nausea] : no nausea [Constipation] : no constipation [Diarrhea] : diarrhea [Vomiting] : no vomiting [Headache] : no headache [Dizziness] : no dizziness [FreeTextEntry6] : clear sputum

## 2020-10-29 NOTE — PLAN
[FreeTextEntry1] : \par She is in optimal medical condition for proposed procedure with the recommendations listed above.

## 2020-11-16 ENCOUNTER — NON-APPOINTMENT (OUTPATIENT)
Age: 80
End: 2020-11-16

## 2020-11-16 DIAGNOSIS — Z87.09 PERSONAL HISTORY OF OTHER DISEASES OF THE RESPIRATORY SYSTEM: ICD-10-CM

## 2020-11-20 ENCOUNTER — APPOINTMENT (OUTPATIENT)
Dept: INTERNAL MEDICINE | Facility: CLINIC | Age: 80
End: 2020-11-20
Payer: MEDICARE

## 2020-11-20 VITALS — SYSTOLIC BLOOD PRESSURE: 122 MMHG | DIASTOLIC BLOOD PRESSURE: 74 MMHG

## 2020-11-20 VITALS
WEIGHT: 126 LBS | HEART RATE: 128 BPM | OXYGEN SATURATION: 95 % | BODY MASS INDEX: 24.61 KG/M2 | SYSTOLIC BLOOD PRESSURE: 111 MMHG | TEMPERATURE: 98.1 F | DIASTOLIC BLOOD PRESSURE: 74 MMHG

## 2020-11-20 DIAGNOSIS — I45.10 UNSPECIFIED RIGHT BUNDLE-BRANCH BLOCK: ICD-10-CM

## 2020-11-20 PROCEDURE — 99214 OFFICE O/P EST MOD 30 MIN: CPT

## 2020-11-20 PROCEDURE — 99354: CPT

## 2020-11-20 RX ORDER — MICONAZOLE NITRATE 1200MG-2%
200-2 KIT VAGINAL
Qty: 1 | Refills: 0 | Status: DISCONTINUED | COMMUNITY
Start: 2020-10-09 | End: 2020-11-20

## 2020-11-20 RX ORDER — NEOMYCIN SULFATE AND POLYMYXIN B SULFATE, BACITRACIN ZINC AND HYDROCORTISONE 3.5; 10000; 400; 1 MG/G; [USP'U]/G; [USP'U]/G; MG/G
1 OINTMENT OPHTHALMIC
Qty: 4 | Refills: 0 | Status: DISCONTINUED | COMMUNITY
Start: 2020-04-24 | End: 2020-11-20

## 2020-11-20 RX ORDER — ASPIRIN ENTERIC COATED TABLETS 81 MG 81 MG/1
81 TABLET, DELAYED RELEASE ORAL DAILY
Refills: 0 | Status: ACTIVE | COMMUNITY
Start: 2020-11-20

## 2020-11-20 RX ORDER — FLUTICASONE PROPIONATE AND SALMETEROL 250; 50 UG/1; UG/1
250-50 POWDER RESPIRATORY (INHALATION) TWICE DAILY
Refills: 0 | Status: ACTIVE | COMMUNITY
Start: 2020-07-23

## 2020-11-20 NOTE — PHYSICAL EXAM
[No Acute Distress] : no acute distress [Well-Appearing] : well-appearing [Normal Sclera/Conjunctiva] : normal sclera/conjunctiva [No JVD] : no jugular venous distention [No Accessory Muscle Use] : no accessory muscle use [Regular Rhythm] : with a regular rhythm [Normal S1, S2] : normal S1 and S2 [de-identified] : +wheezing present bilaterally, no crackles.  [de-identified] : Tachycardic [de-identified] : Trace LE edema on LLE, Trace to 1+ LE edema on RLE. Bruising on RLE. +varicose veins, palpable

## 2020-11-20 NOTE — ASSESSMENT
[FreeTextEntry1] : 1. Shortness of breath and chest/back pain\par Differential broad and includes COPD exacerbation vs arrhythmia (with known hx of prior SVT) vs possibility for DVT and PE with recent cardiac cath. She has never been tachycardic on multiple prior exams and is tachycardic over 120 with active betablocker.\par - Refer to ED for expedited evaluation with LE duplex, cardiac enzymes, and cardiac monitoring\par - Ability to perform CTA limited by contrast allergy and likely will be hard to interpret VQ scan with underlying lung disease\par \par Will go to ED. \par Additional time spent explaining plan of care with patient and coordinating care \par

## 2020-11-20 NOTE — REVIEW OF SYSTEMS
[Chest Pain] : chest pain [Palpitations] : palpitations [Lower Ext Edema] : lower extremity edema [Shortness Of Breath] : shortness of breath [Wheezing] : wheezing [Cough] : cough [Fever] : no fever [Chills] : no chills [Abdominal Pain] : no abdominal pain [Nausea] : no nausea [Vomiting] : no vomiting [Headache] : no headache [Dizziness] : no dizziness

## 2020-11-20 NOTE — HISTORY OF PRESENT ILLNESS
[FreeTextEntry1] : shortness of breath [de-identified] : Kiarra presents today with shortness of breath and chest/back pain.\par \par She began feeling ill about 4 days ago when she was unable to come in for appt due to not having transportation. She was given Zpack which she took with some improvement, however, still feels short of breath and has chest and back pain. She feels like she is unable to catch her breath or take a deep breath in. She also has chest and back discomfort. Cough is somewhat better. Her  was recently hospitalized. Furthermore, also noted some low BPs yesterday in 90s systolics and tachycardia up to 146 at home yesterday. \par \par She recently had cardiac cath which was complicated by concern for RLE vascular compromise which was evaluated by Vascular surgery. She is currently on DAPT with ASA, Plavix.

## 2020-11-27 ENCOUNTER — APPOINTMENT (OUTPATIENT)
Dept: INTERNAL MEDICINE | Facility: CLINIC | Age: 80
End: 2020-11-27
Payer: MEDICARE

## 2020-11-27 VITALS — DIASTOLIC BLOOD PRESSURE: 84 MMHG | SYSTOLIC BLOOD PRESSURE: 152 MMHG

## 2020-11-27 VITALS
TEMPERATURE: 98 F | DIASTOLIC BLOOD PRESSURE: 79 MMHG | RESPIRATION RATE: 12 BRPM | HEART RATE: 73 BPM | WEIGHT: 128 LBS | HEIGHT: 60 IN | SYSTOLIC BLOOD PRESSURE: 160 MMHG | OXYGEN SATURATION: 96 % | BODY MASS INDEX: 25.13 KG/M2

## 2020-11-27 DIAGNOSIS — R00.0 TACHYCARDIA, UNSPECIFIED: ICD-10-CM

## 2020-11-27 PROCEDURE — 99214 OFFICE O/P EST MOD 30 MIN: CPT | Mod: 25

## 2020-11-27 PROCEDURE — 99496 TRANSJ CARE MGMT HIGH F2F 7D: CPT | Mod: 25

## 2020-11-27 PROCEDURE — 36415 COLL VENOUS BLD VENIPUNCTURE: CPT

## 2020-11-27 NOTE — PHYSICAL EXAM
[No Acute Distress] : no acute distress [Well Nourished] : well nourished [Well Developed] : well developed [Well-Appearing] : well-appearing [No JVD] : no jugular venous distention [Thyroid Normal, No Nodules] : the thyroid was normal and there were no nodules present [No Respiratory Distress] : no respiratory distress  [No Accessory Muscle Use] : no accessory muscle use [Clear to Auscultation] : lungs were clear to auscultation bilaterally [Normal Rate] : normal rate  [Regular Rhythm] : with a regular rhythm [Normal S1, S2] : normal S1 and S2 [No Murmur] : no murmur heard [No Carotid Bruits] : no carotid bruits [Soft] : abdomen soft [Non Tender] : non-tender [Non-distended] : non-distended [Normal Gait] : normal gait [Alert and Oriented x3] : oriented to person, place, and time [de-identified] : Trace to 1+ edema

## 2020-11-27 NOTE — HISTORY OF PRESENT ILLNESS
[Post-hospitalization from ___ Hospital] : Post-hospitalization from [unfilled] Hospital [Admitted on: ___] : The patient was admitted on [unfilled] [Discharged on ___] : discharged on [unfilled] [Pertinent Labs] : pertinent labs [Radiology Findings] : radiology findings [Discharge Med List] : discharge medication list [Med Reconciliation] : medication reconciliation has been completed [FreeTextEntry2] : Kiarra Oliva presents after being sent to ER with tachycardia, leg swelling, and chest pressure with associated SOB. While in ED, she had two sets of downtrending troponins, negative d-dimer, low Mg, and EKG with ST and RBBB and normal CXR. She was admitted to CDU for continued monitoring and was discharged after observation with plans for PCP and Cards follow up. She is seeing Dr. Carrington on 12/10. She recently had cardiac cath as well. COVID19 PCR was negative.\par \par She reports feeling much better and much improved. HR has improved back down to normal HR. Sense of chest heaviness and SOB are also better. She does have persistent cough likely 2/2 to underlying COPD and ongoing tobacco use. Her Mg was also low.\par \par She also has been taking BP at home and most have been 140-160s/80s. She was hoping to increase

## 2020-11-27 NOTE — REVIEW OF SYSTEMS
[Fever] : no fever [Chills] : no chills [Night Sweats] : no night sweats [Chest Pain] : no chest pain [Palpitations] : no palpitations [Lower Ext Edema] : lower extremity edema [Shortness Of Breath] : shortness of breath [Wheezing] : wheezing [Cough] : cough [Nausea] : no nausea [Vomiting] : no vomiting [Dizziness] : no dizziness [Fainting] : no fainting [FreeTextEntry6] : improved

## 2020-11-27 NOTE — ASSESSMENT
[FreeTextEntry1] : 1. Chest pressure/SOB\par Resolving, has cards follow up\par Wonder if had transient SVT while tachycardic at home\par Check TFTs\par \par 2. HypoMg\par Recheck BMP, Mg\par \par 3. HTN\par HTN today and at home\par Will increase amlodipine back to 5mg (was on 2.5mg)\par Will need to monitor leg swelling with increased dose

## 2020-11-30 DIAGNOSIS — E83.42 HYPOMAGNESEMIA: ICD-10-CM

## 2020-11-30 LAB
ANION GAP SERPL CALC-SCNC: 12 MMOL/L
BUN SERPL-MCNC: 17 MG/DL
CALCIUM SERPL-MCNC: 10 MG/DL
CHLORIDE SERPL-SCNC: 100 MMOL/L
CO2 SERPL-SCNC: 26 MMOL/L
CREAT SERPL-MCNC: 0.81 MG/DL
GLUCOSE SERPL-MCNC: 122 MG/DL
MAGNESIUM SERPL-MCNC: 1.3 MG/DL
POTASSIUM SERPL-SCNC: 4.4 MMOL/L
SODIUM SERPL-SCNC: 138 MMOL/L
T3 SERPL-MCNC: 83 NG/DL
T3FREE SERPL-MCNC: 2.19 PG/ML
T4 FREE SERPL-MCNC: 1 NG/DL
TSH SERPL-ACNC: 3.42 UIU/ML

## 2020-12-22 ENCOUNTER — RX RENEWAL (OUTPATIENT)
Age: 80
End: 2020-12-22

## 2020-12-23 PROBLEM — Z87.42 HISTORY OF VULVOVAGINITIS: Status: RESOLVED | Noted: 2020-10-09 | Resolved: 2020-12-23

## 2020-12-23 PROBLEM — Z87.09 HISTORY OF ACUTE BRONCHITIS: Status: RESOLVED | Noted: 2020-11-16 | Resolved: 2020-12-23

## 2021-01-14 ENCOUNTER — APPOINTMENT (OUTPATIENT)
Dept: INTERNAL MEDICINE | Facility: CLINIC | Age: 81
End: 2021-01-14
Payer: MEDICARE

## 2021-01-14 VITALS
WEIGHT: 133 LBS | HEART RATE: 69 BPM | HEIGHT: 60 IN | BODY MASS INDEX: 26.11 KG/M2 | TEMPERATURE: 97.8 F | RESPIRATION RATE: 12 BRPM | SYSTOLIC BLOOD PRESSURE: 138 MMHG | OXYGEN SATURATION: 100 % | DIASTOLIC BLOOD PRESSURE: 79 MMHG

## 2021-01-14 VITALS — DIASTOLIC BLOOD PRESSURE: 77 MMHG | SYSTOLIC BLOOD PRESSURE: 128 MMHG

## 2021-01-14 DIAGNOSIS — F17.200 NICOTINE DEPENDENCE, UNSPECIFIED, UNCOMPLICATED: ICD-10-CM

## 2021-01-14 PROCEDURE — 99213 OFFICE O/P EST LOW 20 MIN: CPT | Mod: 25

## 2021-01-14 PROCEDURE — 99072 ADDL SUPL MATRL&STAF TM PHE: CPT

## 2021-01-14 PROCEDURE — 99406 BEHAV CHNG SMOKING 3-10 MIN: CPT

## 2021-01-14 RX ORDER — CHLORHEXIDINE GLUCONATE 4 %
250 LIQUID (ML) TOPICAL DAILY
Qty: 30 | Refills: 0 | Status: DISCONTINUED | COMMUNITY
Start: 2020-11-30 | End: 2021-01-14

## 2021-01-14 NOTE — HISTORY OF PRESENT ILLNESS
[FreeTextEntry1] : follow up HTN, T2DM, smoking  [de-identified] : Kiarra Oliva presents today for follow up. \par \par Since last visit, she had followed up with Cardiology and Pulmonary. She has reduced amlodipine back down to 2.5mg qd. When she saw her pulmonologist, he again was worried about her pulmonary status in light of continued smoking. She is still smoking significant amounts (>/ 1ppd). She feels improved since last time after hospital follow up appt.

## 2021-01-14 NOTE — REVIEW OF SYSTEMS
[Fever] : no fever [Chills] : no chills [Chest Pain] : no chest pain [Palpitations] : no palpitations [Lower Ext Edema] : no lower extremity edema [Shortness Of Breath] : shortness of breath [Wheezing] : wheezing [Cough] : cough [Headache] : no headache [Anxiety] : anxiety

## 2021-01-14 NOTE — ASSESSMENT
[FreeTextEntry1] : 1. HTN\par Improved, remains on current regimen with amlodipine 2.5mg\par New rx for 2.5mg tablets sent\par \par 2. HLD\par Continue statin\par \par 3. T2DM\par Get last labs from Dr. Perlman\par Advised continue low carb diet\par \par 4. Tobacco cessation\par Will discuss bupropion with her Psychiatrist\par

## 2021-01-14 NOTE — PHYSICAL EXAM
[No Acute Distress] : no acute distress [Well Nourished] : well nourished [Well Developed] : well developed [Well-Appearing] : well-appearing [No JVD] : no jugular venous distention [No Accessory Muscle Use] : no accessory muscle use [Normal Rate] : normal rate  [Regular Rhythm] : with a regular rhythm [Normal S1, S2] : normal S1 and S2 [No Edema] : there was no peripheral edema [Non Tender] : non-tender [Non-distended] : non-distended [de-identified] : + wheezing bilaterally

## 2021-01-14 NOTE — COUNSELING
[Risk of tobacco use and health benefits of smoking cessation discussed] : Risk of tobacco use and health benefits of smoking cessation discussed [Cessation strategies including cessation program discussed] : Cessation strategies including cessation program discussed [Use of nicotine replacement therapies and other medications discussed] : Use of nicotine replacement therapies and other medications discussed [Willing to Quit Smoking] : Willing to quit smoking [Tobacco Use Cessation Intermediate Greater Than 3 Minutes Up to 10 Minutes] : Tobacco Use Cessation Intermediate Greater Than 3 Minutes Up to 10 Minutes [FreeTextEntry2] : She will talk with her Psychiatrist about bupropion, given hesitation of exacerbating her current mood symptoms with Chantix. We also discussed NRT. She was initially told to avoid patches with her CAD but she will re-address gum/lozenge with Cards. Advised to try to cut down her cigarettes slowly.

## 2021-02-13 NOTE — ED ADULT NURSE NOTE - PRIMARY CARE PROVIDER
Hospitalist Progress Note    Subjective  Patient was seen and examined today.  Patient is complaining of pain and is not happy with her lower dose of Norco.  Continues to have some shortness of breath but is improving.  No cough no chest pain only pain in her chronic back.  No events overnight.    Objective         Vital Signs (last 24 hrs)_____ Last Charted___________Minimum____________ Maximum____________  Temp    98.5  (JAN 23 07:44) 98.4  (JAN 22 16:40) 98.6  (JAN 22 19:19)  Heart Rate   74  (JAN 23 07:44) 72  (JAN 22 19:19) 79  (JAN 22 12:07)  Resp Rate       17  (JAN 23 07:44) 16  (JAN 22 16:40) 18  (JAN 22 19:19)  SBP    105  (JAN 23 07:44) 93  (JAN 22 16:40) 105  (JAN 23 07:44)  DBP    71  (JAN 23 07:44) 67  (JAN 22 16:40) 71  (JAN 23 07:44)         Physical Exam:  CONSTITUTIONAL: No acute distress. obese NC in place   HEENT: Normocephalic,  PERRLA EOMI, moist oral mucosa  CHEST:  No chest wall tenderness to palpitation  RESPIRATORY: Crackles audible in L lung   CARDIOVASCULAR:   Regular rate and rhythm with normal S1, S2 and no murmur.  .  GASTROINTESTINAL: normoactive bowel sounds, nontender/nondistended, no organomegaly  MUSCULOSKELETAL: no joint effusions; no asymmetry   EXTREMITIES: no LE edema  NEURO:  Grossly normal, AAO x3  SKIN:  No rashes  PSYCH: Appropriate           Labs:  Labs (Last four charted values)  WBC                  8.3 (JAN 22) 9.6 (JAN 21)   Hgb                  12.7 (JAN 22) 12.4 (JAN 21)   Hct                  41 (JAN 22) 40 (JAN 21)   Plt                  194 (JAN 22) 210 (JAN 21)   Na                   142 (JAN 22) 140 (JAN 21)   K                    4.1 (JAN 22) 4.0 (JAN 21)   CO2                  H 35 (JAN 22) H 31 (JAN 21)   Cl                   104 (JAN 22) 102 (JAN 21)   Cr                   0.84 (JAN 22) 0.96 (JAN 21)   BUN                  10 (JAN 22) 12 (JAN 21)   Glucose              87 (JAN 22) 89 (JAN 21)   Phos                 4.1 (JAN 22)   Ca                    8.7 (JAN 22) 9.3 (JAN 21)        Imaging:  reviewed.      Assessment and Plan:  63 years old female with past medical history of CHF, shoulder pain, kyphosis, neuropathy, bipolar disorder, hypertension.  Patient presented to the hospital with recurrent falls over the last 2 weeks prior to admission, with 5 independent episodes of falling, which the patient attributed to weakness in the bilateral lower extremities.  Also some shortness of breath, that is chronic, with no clear aggravating or alleviating factors, no other associated symptoms.  Patient presented to the emergency room and was admitted for further evaluation and management    #Recurrent falls   Likely multifactorial.  Neurology evaluated patient and deemed that there is no neurological conditions relating to her recurrent falls.  Patient tells me that her falls started to happen once her fentanyl patch was increased in dosage.  I felt this is more of an acute illness due to pneumonia along with polypharmacy especially with her opioids since leading her to have the multiple falls.  --Neurology signed off the case recommendations were appreciated  --PT and OT recommends rehab  --Pain management consulted and ultimately patient will need to be weaned off all her medications.  Started patient on 5 mg of Norco.  Will need to be continued to be weaned off as an outpatient with her pain management doctor.    #Acute hypoxic respiratory failure (improving)  Needed 5 L of nasal oxygen originally on admission.  Chest x-ray was done showed infiltrates in the left lower lobe.  CT PE was done as well not show any PE did reinstate the possible atelectasis versus possible infiltrates.  Since patient symptoms have been improving after receiving treatments especially antibiotics feel like this is possibly an infiltrate and pneumonia  --Pulmonary on case recommendations appreciated  --Continue to try to wean off  --Will DC Vanco.  Continue with cefepime and  azithromycin for the time being  --Will obtain MRSA nares     #Left Pulomonary lung nodules   CT picked up nodules in the left lobe.  Patient does have a high risk for lung cancer as she is a smoker.  --Pulmonary on case recommendations appreciated  --Follow-up CT at 3 to 6 months is recommended and then another follow-up in 18 to 24 months check on size and structure of nodules    #Chronic pain in both lower limbs  This is chronic for her.  Pain management consulted recommendations appreciated.  Is giving her half her home dose which I agree with as the polypharmacy from the opioids could be leading towards her weakness and recurrent falls.    #Bipolar with PTSD and anxiety  All stable continue outpatient medications    #Hypertension  Stable continue home meds    Heart Healthy Low Sodium Low Chol Low F Diet -- 01/22/20 3:50:00     DVT prophylaxis : heparin subQ    Ordered Medications:  Medications (21) Active  Scheduled: (17)  amLODIPine 5 mg Tab  5 mg 1 tab, PO, qDay  aspirin 81mg EC Tab  81 mg 1 tab, PO, qAM  atorvastatin 20mg Tab  20 mg 1 tab, PO, qHS  azithromycin Vial  500 mg 1 vial, IV Piggyback, q24hr  cefTRIAXone 1,000 mg/10 mL inj  1,000 mg 10 mL, IV Push, q24hr  cholecalciferol 1,000 unit (25 mcg) Tab  1,000 unit 1 tab, PO, qDay  DULoxetine 30 mg Cap  60 mg 2 cap, PO, qAM  fentaNYL 50 mcg/hr (5 mg) Patch  1 patch, Transdermal, q72hr  fentanyl Patch Removal  1 patch, Topical, q72hr  ferrous sulfate 325 mg Tab  325 mg 1 tab, PO, qDayWL  metoprolol tartrate 25 mg Tab  25 mg 1 tab, PO, q12hrS  nicotine 21 mg/24 hr Patch  1 patch, Transdermal, qDay  nicotine Patch Removal  1 patch, Topical, qDay  pantoprazole 40 mg EC Tab  40 mg 1 tab, PO, qDayACB  pneumococcal vaccine Vial  0.5 mL, IM, Once  spironolactone 25 mg Tab  25 mg 1 tab, PO, qDay  umeclidinium-vilanterol (ANORO) 62.5 mcg-25 mcg/inh Powd  1 puff, Inhalation, QDayRT  Continuous: (0)  PRN: (4)  bisacodyl 5mg EC Tab  10 mg 2 tab, PO, qDay  diazepam 2 mg  Tab  5 mg 2.5 tab, PO, q6hr  HYDROcodone-APAP 5/325 Tab  1 tab, PO, q6hr  traZODone 50 mg Tab  100 mg 2 tab, PO, qHS                  Electronically Signed On 01.23.2020 10:53  ___________________________________________________   Joaquin Victor MD              correction patient is on ceftriaxone not cefepime. Will do one more day of vancomycin until MRSA nares are back. One blood culture grew gram positive coag negative staph likely contamination.            Electronically Signed On 01.23.2020 11:04  ___________________________________________________   Joaquin Victor MD     luis alfredo barrow

## 2021-02-22 ENCOUNTER — RX RENEWAL (OUTPATIENT)
Age: 81
End: 2021-02-22

## 2021-03-15 ENCOUNTER — RX RENEWAL (OUTPATIENT)
Age: 81
End: 2021-03-15

## 2021-04-08 DIAGNOSIS — F41.9 ANXIETY DISORDER, UNSPECIFIED: ICD-10-CM

## 2021-04-15 ENCOUNTER — APPOINTMENT (OUTPATIENT)
Dept: INTERNAL MEDICINE | Facility: CLINIC | Age: 81
End: 2021-04-15
Payer: MEDICARE

## 2021-04-15 ENCOUNTER — NON-APPOINTMENT (OUTPATIENT)
Age: 81
End: 2021-04-15

## 2021-04-15 VITALS
SYSTOLIC BLOOD PRESSURE: 141 MMHG | BODY MASS INDEX: 26.95 KG/M2 | WEIGHT: 138 LBS | HEART RATE: 71 BPM | TEMPERATURE: 98.1 F | OXYGEN SATURATION: 97 % | DIASTOLIC BLOOD PRESSURE: 80 MMHG

## 2021-04-15 VITALS — DIASTOLIC BLOOD PRESSURE: 76 MMHG | SYSTOLIC BLOOD PRESSURE: 126 MMHG

## 2021-04-15 PROCEDURE — 99213 OFFICE O/P EST LOW 20 MIN: CPT

## 2021-04-15 PROCEDURE — 99072 ADDL SUPL MATRL&STAF TM PHE: CPT

## 2021-04-15 RX ORDER — AZITHROMYCIN 250 MG/1
250 TABLET, FILM COATED ORAL
Qty: 1 | Refills: 0 | Status: DISCONTINUED | COMMUNITY
Start: 2020-11-16 | End: 2021-04-15

## 2021-04-15 RX ORDER — LINAGLIPTIN 5 MG/1
5 TABLET, FILM COATED ORAL DAILY
Refills: 0 | Status: DISCONTINUED | COMMUNITY
Start: 2020-06-13 | End: 2021-04-15

## 2021-04-15 NOTE — ASSESSMENT
[FreeTextEntry1] : 1. HTN\par At goal\par Continue losartan and amlodipine\par \par 2. T2DM\par Reports sugars well controlled, has Dexcom\par Will get labs from Endo\par \par 3. Hypothyroidism\par Levothyroxine recently adjusted by Endo\par Will get records

## 2021-04-15 NOTE — HISTORY OF PRESENT ILLNESS
[FreeTextEntry1] : f/u HTN, T2DM, HLD [de-identified] : Kiarra returns for follow up.\par \par She has no new concerns. She has been feeling well overall. She had recent bloodwork with Dr. Perlman (will get records). At her last visit with him, he increased levothyroxine to 75 mcg and also switched her from Tradjenta to Jardiance. She has no other new symptoms.

## 2021-04-15 NOTE — PHYSICAL EXAM
[No Acute Distress] : no acute distress [Well Nourished] : well nourished [Well Developed] : well developed [Well-Appearing] : well-appearing [Normal Sclera/Conjunctiva] : normal sclera/conjunctiva [No Respiratory Distress] : no respiratory distress  [No Accessory Muscle Use] : no accessory muscle use [Clear to Auscultation] : lungs were clear to auscultation bilaterally [Normal Rate] : normal rate  [Regular Rhythm] : with a regular rhythm [Normal S1, S2] : normal S1 and S2 [No Murmur] : no murmur heard [No Carotid Bruits] : no carotid bruits [No Edema] : there was no peripheral edema [Alert and Oriented x3] : oriented to person, place, and time

## 2021-04-15 NOTE — REVIEW OF SYSTEMS
Addended by: SURY THRASHER on: 11/18/2019 01:26 PM     Modules accepted: Orders     [Fever] : no fever [Chills] : no chills [Fatigue] : no fatigue [Night Sweats] : no night sweats [Chest Pain] : no chest pain [Palpitations] : no palpitations [Shortness Of Breath] : no shortness of breath [Wheezing] : no wheezing [Cough] : no cough [Abdominal Pain] : no abdominal pain [Nausea] : no nausea [Vomiting] : no vomiting [Headache] : no headache [Dizziness] : no dizziness

## 2021-04-20 ENCOUNTER — RX RENEWAL (OUTPATIENT)
Age: 81
End: 2021-04-20

## 2021-04-20 RX ORDER — ALFUZOSIN HYDROCHLORIDE 10 MG/1
10 TABLET, EXTENDED RELEASE ORAL DAILY
Qty: 90 | Refills: 3 | Status: DISCONTINUED | COMMUNITY
Start: 2021-04-15 | End: 2021-04-20

## 2021-04-24 ENCOUNTER — RX RENEWAL (OUTPATIENT)
Age: 81
End: 2021-04-24

## 2021-05-03 ENCOUNTER — APPOINTMENT (OUTPATIENT)
Dept: OTOLARYNGOLOGY | Facility: CLINIC | Age: 81
End: 2021-05-03
Payer: MEDICARE

## 2021-05-03 ENCOUNTER — APPOINTMENT (OUTPATIENT)
Dept: INTERNAL MEDICINE | Facility: CLINIC | Age: 81
End: 2021-05-03
Payer: MEDICARE

## 2021-05-03 VITALS
SYSTOLIC BLOOD PRESSURE: 139 MMHG | TEMPERATURE: 97.8 F | OXYGEN SATURATION: 90 % | WEIGHT: 140 LBS | HEART RATE: 78 BPM | DIASTOLIC BLOOD PRESSURE: 77 MMHG | BODY MASS INDEX: 26.45 KG/M2

## 2021-05-03 VITALS
TEMPERATURE: 97.9 F | HEART RATE: 74 BPM | DIASTOLIC BLOOD PRESSURE: 72 MMHG | SYSTOLIC BLOOD PRESSURE: 138 MMHG | HEIGHT: 61 IN | WEIGHT: 136 LBS | BODY MASS INDEX: 25.68 KG/M2

## 2021-05-03 VITALS — OXYGEN SATURATION: 96 %

## 2021-05-03 DIAGNOSIS — R04.0 EPISTAXIS: ICD-10-CM

## 2021-05-03 DIAGNOSIS — R09.01: ICD-10-CM

## 2021-05-03 DIAGNOSIS — E87.8 OTHER DISORDERS OF ELECTROLYTE AND FLUID BALANCE, NOT ELSEWHERE CLASSIFIED: ICD-10-CM

## 2021-05-03 PROCEDURE — 99072 ADDL SUPL MATRL&STAF TM PHE: CPT

## 2021-05-03 PROCEDURE — 99214 OFFICE O/P EST MOD 30 MIN: CPT | Mod: 25

## 2021-05-03 PROCEDURE — 99496 TRANSJ CARE MGMT HIGH F2F 7D: CPT | Mod: 25

## 2021-05-03 PROCEDURE — 31575 DIAGNOSTIC LARYNGOSCOPY: CPT

## 2021-05-03 PROCEDURE — 36415 COLL VENOUS BLD VENIPUNCTURE: CPT

## 2021-05-03 RX ORDER — ALBUTEROL SULFATE 90 UG/1
108 (90 BASE) INHALANT RESPIRATORY (INHALATION)
Qty: 7 | Refills: 0 | Status: COMPLETED | COMMUNITY
Start: 2020-05-12 | End: 2021-05-03

## 2021-05-03 RX ORDER — METFORMIN ER 500 MG 500 MG/1
500 TABLET ORAL 3 TIMES DAILY
Refills: 0 | Status: COMPLETED | COMMUNITY
Start: 2020-07-08 | End: 2021-05-03

## 2021-05-03 RX ORDER — TOBRAMYCIN AND DEXAMETHASONE 3; 1 MG/ML; MG/ML
0.3-0.1 SUSPENSION/ DROPS OPHTHALMIC
Qty: 5 | Refills: 0 | Status: COMPLETED | COMMUNITY
Start: 2020-04-23 | End: 2021-05-03

## 2021-05-03 NOTE — HISTORY OF PRESENT ILLNESS
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 Pt states that she was hospitalized 4/29/21 after choking on a piece of bread. She has been having increased dysphagia. She has been hoarse since leaving the hospital. She had an MRI of her head done as well to r/o stroke. She was cyanotic when she got to the hospital. She was told to puree her food which she has been doing.

## 2021-05-03 NOTE — PHYSICAL EXAM
[Normal] : no rashes [FreeTextEntry1] : thick mucus, extremely dry, dry tongue, unable to tolerate mirror exam

## 2021-05-03 NOTE — PROCEDURE
[de-identified] : Procedure:  Flexible Fiberoptic Laryngoscopy: Risks, benefits, and alternatives of flexible endoscopy were explained to the patient.  The patient gave oral consent to proceed.  The flexible scope was inserted into the left nasal cavity and advanced towards the nasopharynx.  Visualized mucosa over the turbinates and septum were as described above.  The nasopharynx was clear.  Oropharyngeal walls were symmetric and mobile without lesion, mass, or edema.  Hypopharynx was also without  lesion or edema.  Larynx was mobile without lesions. Large amount of thick mucus postcricoid and pyriform pooling, does not clear with swallowing. True vocal folds were white without mass or lesion.  Base of tongue was within normal limits.

## 2021-05-03 NOTE — REVIEW OF SYSTEMS
[Fever] : no fever [Chills] : no chills [Fatigue] : no fatigue [Night Sweats] : no night sweats [Nasal Discharge] : no nasal discharge [Sore Throat] : no sore throat [Chest Pain] : no chest pain [Shortness Of Breath] : no shortness of breath [Cough] : no cough [Abdominal Pain] : no abdominal pain [Headache] : no headache [Dizziness] : no dizziness [Confusion] : no confusion [Unsteady Walking] : no ataxia

## 2021-05-03 NOTE — CONSULT LETTER
[Dear  ___] : Dear  [unfilled], [Courtesy Letter:] : I had the pleasure of seeing your patient, [unfilled], in my office today. [Please see my note below.] : Please see my note below. [Consult Closing:] : Thank you very much for allowing me to participate in the care of this patient.  If you have any questions, please do not hesitate to contact me. [Sincerely,] : Sincerely, [FreeTextEntry3] : Rafael Johns MD FACS

## 2021-05-03 NOTE — ASSESSMENT
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 Increase water intake. Continue to puree foods. FEEST and Videostrobe ASAP. FU after tests.

## 2021-05-03 NOTE — ASSESSMENT
[FreeTextEntry1] : Kiarra presents for HFU after hypoxemic RF 2/2 to choking. Hypoxemic RF resolved following removal of food particles.\par - check CBC to ensure WBC downtrending\par - CMP with Mg,. phos to check on LFTs, lytes\par - Has ENT follow up\par - Will need Cards due to trop leak which was likely 2/2 to hypoxia\par - Pulm follow up for lung nodule which may need CT chest follow uo\par - Pureed diet until completion of ENT eval\par - Neurologically back to baseline with no neurologic deficits

## 2021-05-03 NOTE — ADDENDUM
[FreeTextEntry1] : Documented by Yojana Aguilar acting as scribe for Dr. Johns on 05/03/2021.\par \par All Medical record entries made by the Scribe were at my, Dr. Johns, direction and personally dictated by me on 05/03/2021 . I have reviewed the chart and agree that the record accurately reflects my personal performance of the history, physical exam, assessment and plan. I have also personally directed, reviewed, and agreed with the discharge instructions.

## 2021-05-03 NOTE — HISTORY OF PRESENT ILLNESS
[Post-hospitalization from ___ Hospital] : Post-hospitalization from [unfilled] Hospital [Admitted on: ___] : The patient was admitted on [unfilled] [Discharged on ___] : discharged on [unfilled] [Pertinent Labs] : pertinent labs [Radiology Findings] : radiology findings [Discharge Med List] : discharge medication list [FreeTextEntry2] : Kiarra presents for follow up after admission at [facility].\par \par Kiarra choked on piece of bread at her home and then became unresponsive. EMS was called and activated stroke code due to concern for facial droop. When she arrived at ED, she was cyanotic. She was intubated where food material was removed from airways. She ultimately was extubated in ED and discharged following day. CT head unrevealing. Chest XR without signs of pneumonia or infiltrate, but did demonstrate nodule. Labs notable for initial hypoxemic RF, leukocytosis, transaminitis, troponin leak, and lactic acidosis. ECHO was unchanged. She was discharged on pureed diet.\par \par She saw ENT today and is planned for FEES. She was also recommended to follow with Pulmonary and Cardiology upon discharge. \par \par \par She currently feels back to baseline. No cough, SOB, or sore throat. She did have some fatigue over weekend but otherwise feels well. She has no mumbled speech, confusion, or other neurologic symptoms.

## 2021-05-03 NOTE — REVIEW OF SYSTEMS
[Dizziness] : dizziness [Vertigo] : vertigo [Problem Snoring] : problem snoring [Hoarseness] : hoarseness [Throat Pain] : throat pain [Throat Dryness] : throat dryness [Shortness Of Breath] : shortness of breath [Wheezing] : wheezing [Cough] : cough [Anxiety] : anxiety [Swollen Glands] : swollen glands [Negative] : Endocrine [FreeTextEntry7] : difficulty swallowing

## 2021-05-03 NOTE — PHYSICAL EXAM
[No Acute Distress] : no acute distress [Well Nourished] : well nourished [Well Developed] : well developed [Well-Appearing] : well-appearing [Normal Sclera/Conjunctiva] : normal sclera/conjunctiva [No JVD] : no jugular venous distention [No Respiratory Distress] : no respiratory distress  [No Accessory Muscle Use] : no accessory muscle use [Clear to Auscultation] : lungs were clear to auscultation bilaterally [Normal Rate] : normal rate  [Regular Rhythm] : with a regular rhythm [Normal S1, S2] : normal S1 and S2 [No Murmur] : no murmur heard [No Carotid Bruits] : no carotid bruits [No Edema] : there was no peripheral edema [Soft] : abdomen soft [Non Tender] : non-tender [Non-distended] : non-distended [Normal Bowel Sounds] : normal bowel sounds [No Focal Deficits] : no focal deficits

## 2021-05-04 ENCOUNTER — APPOINTMENT (OUTPATIENT)
Dept: OTOLARYNGOLOGY | Facility: CLINIC | Age: 81
End: 2021-05-04
Payer: MEDICARE

## 2021-05-04 LAB
ALBUMIN SERPL ELPH-MCNC: 4.3 G/DL
ALP BLD-CCNC: 62 U/L
ALT SERPL-CCNC: 25 U/L
ANION GAP SERPL CALC-SCNC: 16 MMOL/L
AST SERPL-CCNC: 21 U/L
BILIRUB SERPL-MCNC: 0.6 MG/DL
BUN SERPL-MCNC: 15 MG/DL
CALCIUM SERPL-MCNC: 10.3 MG/DL
CHLORIDE SERPL-SCNC: 98 MMOL/L
CO2 SERPL-SCNC: 22 MMOL/L
CREAT SERPL-MCNC: 0.81 MG/DL
GLUCOSE SERPL-MCNC: 105 MG/DL
MAGNESIUM SERPL-MCNC: 1.6 MG/DL
PHOSPHATE SERPL-MCNC: 4.8 MG/DL
POTASSIUM SERPL-SCNC: 4.5 MMOL/L
PROT SERPL-MCNC: 6.6 G/DL
SODIUM SERPL-SCNC: 135 MMOL/L

## 2021-05-04 PROCEDURE — 99072 ADDL SUPL MATRL&STAF TM PHE: CPT

## 2021-05-04 PROCEDURE — 31579 LARYNGOSCOPY TELESCOPIC: CPT

## 2021-05-04 PROCEDURE — 92612 ENDOSCOPY SWALLOW (FEES) VID: CPT | Mod: GN

## 2021-05-04 PROCEDURE — 31579 LARYNGOSCOPY TELESCOPIC: CPT | Mod: GN

## 2021-05-05 ENCOUNTER — RESULT REVIEW (OUTPATIENT)
Age: 81
End: 2021-05-05

## 2021-05-05 ENCOUNTER — APPOINTMENT (OUTPATIENT)
Dept: OTOLARYNGOLOGY | Facility: CLINIC | Age: 81
End: 2021-05-05
Payer: MEDICARE

## 2021-05-05 VITALS
TEMPERATURE: 96.8 F | WEIGHT: 140 LBS | HEIGHT: 61 IN | HEART RATE: 71 BPM | SYSTOLIC BLOOD PRESSURE: 122 MMHG | BODY MASS INDEX: 26.43 KG/M2 | DIASTOLIC BLOOD PRESSURE: 79 MMHG

## 2021-05-05 PROCEDURE — 99213 OFFICE O/P EST LOW 20 MIN: CPT

## 2021-05-05 PROCEDURE — 99072 ADDL SUPL MATRL&STAF TM PHE: CPT

## 2021-05-05 RX ORDER — METFORMIN HYDROCHLORIDE 500 MG/1
500 TABLET, EXTENDED RELEASE ORAL
Qty: 90 | Refills: 0 | Status: COMPLETED | COMMUNITY
Start: 2020-03-03 | End: 2021-05-05

## 2021-05-05 RX ORDER — NEOMYCIN SULFATE, POLYMYXIN B SULFATE AND DEXAMETHASONE 3.5; 10000; 1 MG/ML; [USP'U]/ML; MG/ML
3.5-10000-0.1 SUSPENSION OPHTHALMIC
Qty: 5 | Refills: 0 | Status: COMPLETED | COMMUNITY
Start: 2021-04-25

## 2021-05-05 RX ORDER — DENOSUMAB 60 MG/ML
60 INJECTION SUBCUTANEOUS
Qty: 1 | Refills: 0 | Status: ACTIVE | COMMUNITY
Start: 2021-01-04

## 2021-05-05 RX ORDER — EMPAGLIFLOZIN 10 MG/1
10 TABLET, FILM COATED ORAL
Refills: 0 | Status: COMPLETED | COMMUNITY
Start: 2021-04-15 | End: 2021-05-05

## 2021-05-05 RX ORDER — NEOMYCIN AND POLYMYXIN B SULFATES AND DEXAMETHASONE 3.5; 10000; 1 MG/G; [IU]/G; MG/G
3.5-10000-0.1 OINTMENT OPHTHALMIC
Qty: 4 | Refills: 0 | Status: COMPLETED | COMMUNITY
Start: 2021-03-29

## 2021-05-05 NOTE — REASON FOR VISIT
[Subsequent Evaluation] : a subsequent evaluation for [FreeTextEntry2] : Patient here to follow up on results

## 2021-05-05 NOTE — ASSESSMENT
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 Soft diet with liquids. Voice and swallowing therapy. Increase water intake. MBS. FU after test.

## 2021-05-05 NOTE — ADDENDUM
[FreeTextEntry1] : Documented by Yojana Aguilar acting as scribe for Dr. Johns on 05/05/2021.\par \par All Medical record entries made by the Scribe were at my, Dr. Johns, direction and personally dictated by me on 05/05/2021 . I have reviewed the chart and agree that the record accurately reflects my personal performance of the history, physical exam, assessment and plan. I have also personally directed, reviewed, and agreed with the discharge instructions.

## 2021-05-05 NOTE — HISTORY OF PRESENT ILLNESS
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 Pt presents today for results.

## 2021-05-10 ENCOUNTER — APPOINTMENT (OUTPATIENT)
Dept: INTERNAL MEDICINE | Facility: CLINIC | Age: 81
End: 2021-05-10
Payer: MEDICARE

## 2021-05-10 VITALS
BODY MASS INDEX: 25.49 KG/M2 | HEART RATE: 71 BPM | RESPIRATION RATE: 12 BRPM | SYSTOLIC BLOOD PRESSURE: 125 MMHG | HEIGHT: 61 IN | OXYGEN SATURATION: 98 % | WEIGHT: 135 LBS | DIASTOLIC BLOOD PRESSURE: 63 MMHG

## 2021-05-10 VITALS — SYSTOLIC BLOOD PRESSURE: 134 MMHG | DIASTOLIC BLOOD PRESSURE: 68 MMHG

## 2021-05-10 DIAGNOSIS — R91.1 SOLITARY PULMONARY NODULE: ICD-10-CM

## 2021-05-10 PROCEDURE — 99072 ADDL SUPL MATRL&STAF TM PHE: CPT

## 2021-05-10 PROCEDURE — 99213 OFFICE O/P EST LOW 20 MIN: CPT

## 2021-05-10 NOTE — HISTORY OF PRESENT ILLNESS
[FreeTextEntry1] : f/u HTN [de-identified] : Kiarra presents for concern about elevated BP.\par \par She has been taking her BPs and BPs are always 160-170s in the AM. She has been taking amlodipine 5mg qAM and 2.5mg qPM (she increased her dose). BPs in afternoon are occassionally high at 150s but not often. Her BPs have been well controlled in office. She does have occasional dizziness but is unsure if that is related her diabetes.

## 2021-05-10 NOTE — REVIEW OF SYSTEMS
[Fever] : no fever [Chills] : no chills [Fatigue] : no fatigue [Chest Pain] : no chest pain [Palpitations] : no palpitations [Shortness Of Breath] : no shortness of breath [Abdominal Pain] : no abdominal pain [Headache] : no headache

## 2021-05-10 NOTE — ASSESSMENT
[FreeTextEntry1] : 1. HTN\par BPs at goal today and occassionally has lightheadedness\par Switch dosing- 5mg qPM and 2.5mg qAM\par Monitor AM and PM blood pressures and to call if still high\par If still high, can consider moving to 5mg BID\par \par 2. Pulmonary nodule\par Seen on CXR from ED\par Advised her to follow up with her pulmonologist who had been doing routine CT scans

## 2021-05-10 NOTE — PHYSICAL EXAM
[No Acute Distress] : no acute distress [Well Nourished] : well nourished [Well Developed] : well developed [Well-Appearing] : well-appearing [Normal Sclera/Conjunctiva] : normal sclera/conjunctiva [No Respiratory Distress] : no respiratory distress  [No Accessory Muscle Use] : no accessory muscle use [Clear to Auscultation] : lungs were clear to auscultation bilaterally [Normal Rate] : normal rate  [Regular Rhythm] : with a regular rhythm [Normal S1, S2] : normal S1 and S2 [No Murmur] : no murmur heard [No Edema] : there was no peripheral edema

## 2021-05-14 ENCOUNTER — NON-APPOINTMENT (OUTPATIENT)
Age: 81
End: 2021-05-14

## 2021-05-14 ENCOUNTER — OUTPATIENT (OUTPATIENT)
Dept: OUTPATIENT SERVICES | Facility: HOSPITAL | Age: 81
LOS: 1 days | End: 2021-05-14
Payer: MEDICARE

## 2021-05-14 DIAGNOSIS — R13.12 DYSPHAGIA, OROPHARYNGEAL PHASE: ICD-10-CM

## 2021-05-14 PROCEDURE — 92611 MOTION FLUOROSCOPY/SWALLOW: CPT

## 2021-05-14 PROCEDURE — 74230 X-RAY XM SWLNG FUNCJ C+: CPT | Mod: 26

## 2021-05-14 PROCEDURE — 74230 X-RAY XM SWLNG FUNCJ C+: CPT

## 2021-05-14 NOTE — ASSESSMENT
[FreeTextEntry1] : MODIFIED BARIUM SWALLOW STUDY\par \par Date of Report: 05/14/2021\par Date of Evaluation: 05/14/2021\par Patient Name: Kiarra Oliva\par YOB: 1940\par Date of Onset: ongoing \par Primary Diagnosis: Pharyngeal Dysphagia \par Treatment Diagnosis: Pharyngeal Dysphagia\par Referring Physician: Dr. Rafael Johns\par \par Impressions/Results: There was no laryngeal penetration/aspiration visualized for puree, soft solid, mechanical soft solid, or nectar thick liquids. There was one instance of silent laryngeal penetration without full retrieval on consecutive cup sips of thin liquids which was not reduplicated on small, single cup sips of thin liquids.\par \par Recommendations:\par 1) The patient was advised to consume a mechanical soft solid (moistened, soft solid) with thin liquid diet, as tolerated.\par 2) The patient was advised to allow for swallow between intakes and to consume small bites and small, single cup sips at a decreased rate of consumption and to chew mechanical soft solids well\par 3) The patient was advised to position herself upright (no <90 degrees) during and after eating for 30 minutes\par 4) Swallowing therapy is recommended to maximize overall swallow function.\par 5) Consider a nutritional consult to ensure patient is meeting adequate daily caloric intake.\par 6) Follow up with referring MD as directed. \par \par History: This 80 year old female was seen this morning for a Modified Barium Swallow Study to: _x__rule out aspiration; __x_assess for diet texture change as appropriate; and/or _x__ explore positional strategies and/or compensatory techniques to eliminate penetration/aspiration. \par \par Reason for Referral: To determine patient's ability to safely tolerate an oral diet.\par \par The physician ordered this procedure because he wants the patient to meet their nutrition/hydration needs by mouth without compromising respiratory status. The patient is known to this department as she was recently seen for a Flexible Endoscopic Evaluation of Swallowing (FEES) and a videostroboscopy on 5/4/21 (please see chart for full reports).\par \par “VIDEOSTROBOSCOPY: \par The patient was explained the videostrobscopy and FEES procedures, and consent was obtained. A nasendoscope was passed via the left nare without difficulty and positioned above the supraglottic structures. The epiglottis, aryepiglottic folds, arytenoids, and true vocal folds were clearly visible with mild edema and erythema noted. Assessment of the TVF revealed a mild thinning of the left TVF as compared to the right. Assessment of TVF abduction and adduction revealed bilateral movement however, intermittent, reduced movement of the left TVF was noted as compared to that of the right, resulting in intermittent periods of incomplete closure throughout all phonatory tasks. In attempt to achieve full glottic closure, the patient was observed to engage in significant hyperfunction with recruitment of the false vocal folds resulting in frequent periods of ventricular phonation, which disallowed for visualization of the true vocal folds. Amplitude and magnitude of the mucosal wave were grossly WFL.”\par \par “FEES PROCEDURE \par The epiglottis, aryepiglottic folds, arytenoids, and true vocal folds were clearly visible with mild edema and erythema noted. For the purposes of today’s assessment, the patient was provided with regular/solid, puree and thin liquid textures. Given solid, mechanical soft and puree textures, the patient demonstrates a mildly delayed pharyngeal swallow trigger, moderately reduced tongue base propulsion, moderately reduced hyolaryngeal contractility and moderately reduced hyolaryngeal excursion resulting in mild stasis at the base of tongue, valleculae, posterior pharyngeal wall and bilateral pyriforms. Stasis noted to reduce at above anatomical landmarks when patient is presented with moistened, mechanical soft textures and small, single cup sips of thin liquid. There is no evidence of penetration or aspiration noted. Of note, there was intermittent visualization of bolus tinged retention at the proximal esophagus, which would reduce given a liquid wash, suggesting ?esophageal component to patient’s dysphagia. At this point, the scope was removed and testing was discontinued, with the patient tolerating the procedure without difficulty.”\par \par It was recommended that the patient consume a moistened, soft solid diet (dysphagia 2) with thin liquids and pursue voice and swallowing therapy. Per patient’s report, she remains hesitant to consume chewable solids and has been consuming puree with thin liquids.\par \par The patient has been experiencing persistent dysphagia and dysphonia since 2012 following RT to base of tongue and most recently experienced a choking episode on a piece of bread in April 2021. Per chart review, “patient presented to [facility] ED cyanotic and required intubation with removal of food particles from her airway.” Upon clinician questioning, she is denying odynophagia, recent/recurrent pneumonia or unintentional weight loss. \par \par Current Nutritional Intake: Per patient’s report, she has been consuming a puree with thin liquid diet.\par \par Medical History: As per chart review, the patient’s medical and surgical history are significant for:\par \par Active Problems\par Anxiety (300.00) (F41.9)\par Asphyxia (799.01) (R09.01)\par Benign essential HTN (401.1) (I10)\par CAD (coronary artery disease) (414.00) (I25.10)\par Choking due to food in larynx, subsequent encounter (V58.89,933.1) (T17.320D)\par Dehydration (276.51) (E86.0)\par Depression (311) (F32.9)\par Deviated septum (470) (J34.2)\par Dry mouth not due to sicca syndrome (527.7) (R68.2)\par Dysphagia, oropharyngeal phase (787.22) (R13.12)\par Electrolyte disturbance (276.9) (E87.8)\par Elevated LFTs (790.6) (R79.89)\par Epistaxis (784.7) (R04.0)\par History of oral cancer (V10.02) (Z85.819)\par Hyperlipidemia, unspecified (272.4) (E78.5)\par Hypomagnesemia (275.2) (E83.42)\par Hypothyroidism (244.9) (E03.9)\par Pre-operative exam (V72.84) (Z01.818)\par Right bundle branch block (426.4) (I45.10)\par Shortness of breath (786.05) (R06.02)\par Tachycardia (785.0) (R00.0)\par \par Past Medical History\par Unreviewed Unverified: History of hypertension (V12.59) (Z86.79)\par History of acute bronchitis (V12.69) (Z87.09)\par History of acute bronchitis (V12.69) (Z87.09)\par History of type 2 diabetes mellitus (V12.29) (Z86.39)\par History of vulvovaginitis (V13.29) (Z87.42)\par \par Current Respiratory Status: __x_ Normal ___ Tracheostomy Tube \par \par ASSESSMENT\par \par The patient was assessed standing in the lateral and AP planes in the [facility], with Radiologist present. \par The patient was _x_ alert ___lethargic _x__cooperative ___uncooperative.\par Secretion management was __x_adequate ___inadequate. \par \par Consistencies Administered: \par Solids: ___ Regular _x__Mechanical Soft _x__Soft solid _x__Puree \par Liquids: _x__ Thin __x_ Nectar Thick ___Honey Thick \par __x_ single cup sips _x__ consecutive cup sips _x_ teaspoon presentations\par \par SUMMARY & IMPRESSION\par The patient demonstrated the following:\par \par Preliminary Fluoroscopy reveals:\par 1) At baseline, the patient demonstrated velopharyngeal movement that was WFL.\par 2) At baseline, the patient demonstrated a delayed pharyngeal swallow trigger and reduced hyolaryngeal elevation and excursion. \par \par Videofluoroscopic Evaluation Reveals:\par \par 1) The patient demonstrated functional oral management of puree, mechanical soft solids, nectar thick, and thin liquid textures marked by adequate oral acceptance with adequate bolus collection, transfer, and AP transit time. Trace lingual and palatal residue observed post primary swallow which cleared with a spontaneous re-swallow.\par 2) The patient demonstrated a mild oral dysphagia for soft solids marked by adequate oral acceptance with prolonged bolus manipulation and increased mastication time resulting in delayed AP transit time. Trace-mild lingual and palatal residue noted subsequent to deglutition which reduced with a liquid wash and a spontaneous re-swallow.\par 3) The patient demonstrated a mild pharyngeal dysphagia for puree, mechanical soft solid, soft solid, nectar thick, and thin liquid textures marked by a timely pharyngeal swallow trigger with reduced base of tongue retraction, reduced epiglottic deflection, reduced hyolaryngeal elevation, and reduced pharyngeal contractility resulting in trace stasis along the base of tongue, mild-moderate stasis in the vallecula (soft solids > mechanical soft solids), and trace stasis along the posterior pharyngeal wall and in the pyriforms which reduced with a liquid wash and a subsequent swallow. Incomplete closure of the laryngeal vestibule was observed on consecutive cup sips of thin liquids resulting in silent laryngeal penetration during the swallow without retrieval. This was not reduplicated when the patient self-fed single cup sips. Absent patient response indicative of reduced laryngo-pharyngeal sensation. No additional laryngeal penetration/aspiration observed for puree, soft solid, mechanical soft solid, or nectar thick liquids.\par 4) Of note: An esophageal screen was performed. The patient was given a puree bolus in lateral and AP views. The puree bolus was observed to course through the oropharynx and esophagus without any evidence of hold up. This cannot be considered a formal assessment of the esophagus.\par \par Aspiration - Penetration Scale: 1- Puree, mechanical soft solid, soft solid, nectar thick liquids; 3- Thin liquids via consecutive cup sips; 1- Thin liquids via small, single cup sips\par \par Aspiration - Penetration Scale (Macarenabek et al Dysphagia 11:93-98 (April 1996), Aspiration-Penetration Scale)\par 1. Material does not enter the airway\par 2. Material enters the airway, remains above the vocal folds, and is\par ejected from the airway\par 3. Material enters the airway, remains above the vocal folds, and is not\par ejected\par 4. Material enters the airway, contacts the vocal folds, and is ejected\par from the airway\par 5. Material enters the airway, contacts the vocal folds, and is not ejected\par from the airway\par 6. Material enters the airway, passes below the vocal folds and is ejected\par into the larynx or out of the airway\par 7. Material enters the airway, passes below the vocal folds, and is not\par ejected from the trachea despite effort\par 8. Material enters the airway, passes below the vocal folds, and no effort\par is made to eject\par \par The above results and recommendations have been discussed with the patient and her , who verbalized full understanding.\par \par Should you have any additional concerns, please contact the Center at [phone].\par \par Fely Dior M.S. CCC-SLP\par Senior Speech-Language Pathologist\par [facility]\par

## 2021-05-20 ENCOUNTER — NON-APPOINTMENT (OUTPATIENT)
Age: 81
End: 2021-05-20

## 2021-05-20 ENCOUNTER — APPOINTMENT (OUTPATIENT)
Dept: INTERNAL MEDICINE | Facility: CLINIC | Age: 81
End: 2021-05-20
Payer: MEDICARE

## 2021-05-20 VITALS
WEIGHT: 134 LBS | BODY MASS INDEX: 25.3 KG/M2 | DIASTOLIC BLOOD PRESSURE: 74 MMHG | RESPIRATION RATE: 12 BRPM | HEART RATE: 64 BPM | HEIGHT: 61 IN | SYSTOLIC BLOOD PRESSURE: 119 MMHG | OXYGEN SATURATION: 95 % | TEMPERATURE: 97.6 F

## 2021-05-20 PROCEDURE — 99072 ADDL SUPL MATRL&STAF TM PHE: CPT

## 2021-05-20 PROCEDURE — 99214 OFFICE O/P EST MOD 30 MIN: CPT | Mod: 25

## 2021-05-20 PROCEDURE — 93000 ELECTROCARDIOGRAM COMPLETE: CPT

## 2021-05-20 PROCEDURE — 36415 COLL VENOUS BLD VENIPUNCTURE: CPT

## 2021-05-21 NOTE — REVIEW OF SYSTEMS
[Dizziness] : dizziness [Fever] : no fever [Chills] : no chills [Fatigue] : no fatigue [Night Sweats] : no night sweats [Chest Pain] : no chest pain [Palpitations] : no palpitations [Shortness Of Breath] : no shortness of breath [Wheezing] : no wheezing [Cough] : no cough [Abdominal Pain] : no abdominal pain

## 2021-05-21 NOTE — ASSESSMENT
[FreeTextEntry1] : 1. Dizziness 2/2 orthostatic hypotension, dehydration\par Orthostasis likely from increased anti-HTN, dehydration, and diuretic effect of SGLT2\par Reduced amlodipine to prior dose of 5mg-- 2.5mg BID\par Hold Jardiance\par Increase PO intake\par EKG with 1st deg block though has had issues with SVT in past so will keep metoprolol as is for now\par CBC, BMP, Mg, UA\par Return Monday for re-eval

## 2021-05-21 NOTE — HISTORY OF PRESENT ILLNESS
[FreeTextEntry1] : dizziness [de-identified] : Kiarra presents with concerns about dizzines.\par \par At last visit, she had self increased amlodipine. She was continued on amlodipine 2.5mg qAM and 5mg qPM. After that, developed dizziness. Dizziness occurred when moving from laying or sitting to standing. She monitored her BPs and BP were in 140-150s systolics. Dizziness felt like being unsteady but no room spinning. No syncope.

## 2021-05-21 NOTE — PHYSICAL EXAM
[No Acute Distress] : no acute distress [Well Developed] : well developed [Well Nourished] : well nourished [Well-Appearing] : well-appearing [No Respiratory Distress] : no respiratory distress  [No Accessory Muscle Use] : no accessory muscle use [Clear to Auscultation] : lungs were clear to auscultation bilaterally [Normal Rate] : normal rate  [Regular Rhythm] : with a regular rhythm [Normal S1, S2] : normal S1 and S2 [No Murmur] : no murmur heard [No Carotid Bruits] : no carotid bruits [No Edema] : there was no peripheral edema [Normal Gait] : normal gait [Alert and Oriented x3] : oriented to person, place, and time [de-identified] : mucus membranes dry

## 2021-05-24 ENCOUNTER — NON-APPOINTMENT (OUTPATIENT)
Age: 81
End: 2021-05-24

## 2021-05-24 ENCOUNTER — APPOINTMENT (OUTPATIENT)
Dept: INTERNAL MEDICINE | Facility: CLINIC | Age: 81
End: 2021-05-24
Payer: MEDICARE

## 2021-05-24 VITALS
OXYGEN SATURATION: 97 % | TEMPERATURE: 98.1 F | SYSTOLIC BLOOD PRESSURE: 122 MMHG | HEART RATE: 70 BPM | DIASTOLIC BLOOD PRESSURE: 75 MMHG

## 2021-05-24 DIAGNOSIS — R42 DIZZINESS AND GIDDINESS: ICD-10-CM

## 2021-05-24 DIAGNOSIS — I95.1 ORTHOSTATIC HYPOTENSION: ICD-10-CM

## 2021-05-24 PROCEDURE — 99214 OFFICE O/P EST MOD 30 MIN: CPT | Mod: 25

## 2021-05-24 PROCEDURE — 93000 ELECTROCARDIOGRAM COMPLETE: CPT

## 2021-05-24 PROCEDURE — 99072 ADDL SUPL MATRL&STAF TM PHE: CPT

## 2021-05-25 NOTE — ASSESSMENT
[FreeTextEntry1] : 1. Dizziness\par Orthostasis has resolved and dizziness is improving\par EKG still with 1st deg block\par Suspect symptoms were more from orthostasis and dehydration than symptomatic first deg block\par Advised to continue amlodipine 2.5mg BID and to continue holding Jardiance\par Advised to call Endo and see if Jardiance can be replaced with something else\par Advised to see Cardiology regarding first deg block and titration of metoprolol\par Labs with mild hypoNa- will have labs with specialist in next weeks \par Call if symptoms worsnening

## 2021-05-25 NOTE — HISTORY OF PRESENT ILLNESS
[FreeTextEntry1] : f/u dizziness [de-identified] : Kiarra presents for follow up.\par \par At last visit, she was feeling dizzy and was orthostatic. Amlodipine reduced to 2.5mg BID and was told to hold Jardiance and increase hydration. She began feeling better. She had one episode of mild dizziness on Sunday afternoon but overall has felt improved. Sugars higher since stopping Jardiance.

## 2021-05-26 LAB
ANION GAP SERPL CALC-SCNC: 15 MMOL/L
APPEARANCE: CLEAR
BACTERIA: NEGATIVE
BASOPHILS # BLD AUTO: 0.04 K/UL
BASOPHILS NFR BLD AUTO: 0.4 %
BILIRUBIN URINE: NEGATIVE
BLOOD URINE: NEGATIVE
BUN SERPL-MCNC: 16 MG/DL
CALCIUM SERPL-MCNC: 10.3 MG/DL
CHLORIDE SERPL-SCNC: 98 MMOL/L
CO2 SERPL-SCNC: 21 MMOL/L
COLOR: NORMAL
CREAT SERPL-MCNC: 0.76 MG/DL
EOSINOPHIL # BLD AUTO: 0.18 K/UL
EOSINOPHIL NFR BLD AUTO: 1.6 %
GLUCOSE QUALITATIVE U: ABNORMAL
GLUCOSE SERPL-MCNC: 124 MG/DL
HCT VFR BLD CALC: 39.7 %
HGB BLD-MCNC: 12.7 G/DL
HYALINE CASTS: 1 /LPF
IMM GRANULOCYTES NFR BLD AUTO: 0.4 %
KETONES URINE: NEGATIVE
LEUKOCYTE ESTERASE URINE: NEGATIVE
LYMPHOCYTES # BLD AUTO: 1.93 K/UL
LYMPHOCYTES NFR BLD AUTO: 17.6 %
MAGNESIUM SERPL-MCNC: 1.6 MG/DL
MAN DIFF?: NORMAL
MCHC RBC-ENTMCNC: 28.3 PG
MCHC RBC-ENTMCNC: 32 GM/DL
MCV RBC AUTO: 88.4 FL
MICROSCOPIC-UA: NORMAL
MONOCYTES # BLD AUTO: 0.86 K/UL
MONOCYTES NFR BLD AUTO: 7.8 %
NEUTROPHILS # BLD AUTO: 7.92 K/UL
NEUTROPHILS NFR BLD AUTO: 72.2 %
NITRITE URINE: NEGATIVE
PH URINE: 6.5
PLATELET # BLD AUTO: 369 K/UL
POTASSIUM SERPL-SCNC: 4.4 MMOL/L
PROTEIN URINE: NEGATIVE
RBC # BLD: 4.49 M/UL
RBC # FLD: 14.7 %
RED BLOOD CELLS URINE: 1 /HPF
SODIUM SERPL-SCNC: 134 MMOL/L
SPECIFIC GRAVITY URINE: 1.01
SQUAMOUS EPITHELIAL CELLS: 1 /HPF
UROBILINOGEN URINE: NORMAL
WBC # FLD AUTO: 10.97 K/UL
WHITE BLOOD CELLS URINE: 1 /HPF

## 2021-06-11 ENCOUNTER — APPOINTMENT (OUTPATIENT)
Dept: OTOLARYNGOLOGY | Facility: CLINIC | Age: 81
End: 2021-06-11
Payer: MEDICARE

## 2021-06-11 PROCEDURE — 92526 ORAL FUNCTION THERAPY: CPT | Mod: GN

## 2021-06-11 PROCEDURE — 99072 ADDL SUPL MATRL&STAF TM PHE: CPT

## 2021-06-14 ENCOUNTER — APPOINTMENT (OUTPATIENT)
Dept: OTOLARYNGOLOGY | Facility: CLINIC | Age: 81
End: 2021-06-14

## 2021-06-16 ENCOUNTER — APPOINTMENT (OUTPATIENT)
Dept: OTOLARYNGOLOGY | Facility: CLINIC | Age: 81
End: 2021-06-16
Payer: MEDICARE

## 2021-06-16 ENCOUNTER — RX RENEWAL (OUTPATIENT)
Age: 81
End: 2021-06-16

## 2021-06-16 PROCEDURE — 92526 ORAL FUNCTION THERAPY: CPT | Mod: GN

## 2021-06-16 PROCEDURE — 99072 ADDL SUPL MATRL&STAF TM PHE: CPT

## 2021-06-23 ENCOUNTER — APPOINTMENT (OUTPATIENT)
Dept: OTOLARYNGOLOGY | Facility: CLINIC | Age: 81
End: 2021-06-23
Payer: MEDICARE

## 2021-06-23 PROCEDURE — 92526 ORAL FUNCTION THERAPY: CPT | Mod: GN

## 2021-06-23 PROCEDURE — 99072 ADDL SUPL MATRL&STAF TM PHE: CPT

## 2021-06-30 ENCOUNTER — APPOINTMENT (OUTPATIENT)
Dept: OTOLARYNGOLOGY | Facility: CLINIC | Age: 81
End: 2021-06-30

## 2021-07-07 ENCOUNTER — APPOINTMENT (OUTPATIENT)
Dept: OTOLARYNGOLOGY | Facility: CLINIC | Age: 81
End: 2021-07-07
Payer: MEDICARE

## 2021-07-07 ENCOUNTER — RX RENEWAL (OUTPATIENT)
Age: 81
End: 2021-07-07

## 2021-07-07 PROCEDURE — 92526 ORAL FUNCTION THERAPY: CPT | Mod: GN

## 2021-07-07 PROCEDURE — 99072 ADDL SUPL MATRL&STAF TM PHE: CPT

## 2021-07-16 ENCOUNTER — APPOINTMENT (OUTPATIENT)
Dept: INTERNAL MEDICINE | Facility: CLINIC | Age: 81
End: 2021-07-16

## 2021-07-19 ENCOUNTER — APPOINTMENT (OUTPATIENT)
Dept: INTERNAL MEDICINE | Facility: CLINIC | Age: 81
End: 2021-07-19
Payer: MEDICARE

## 2021-07-19 VITALS
DIASTOLIC BLOOD PRESSURE: 66 MMHG | HEART RATE: 73 BPM | SYSTOLIC BLOOD PRESSURE: 118 MMHG | TEMPERATURE: 97.8 F | OXYGEN SATURATION: 94 % | WEIGHT: 135 LBS | BODY MASS INDEX: 25.51 KG/M2

## 2021-07-19 DIAGNOSIS — T78.40XA ALLERGY, UNSPECIFIED, INITIAL ENCOUNTER: ICD-10-CM

## 2021-07-19 PROCEDURE — 99214 OFFICE O/P EST MOD 30 MIN: CPT | Mod: 25

## 2021-07-19 PROCEDURE — 99072 ADDL SUPL MATRL&STAF TM PHE: CPT

## 2021-07-19 PROCEDURE — 36415 COLL VENOUS BLD VENIPUNCTURE: CPT

## 2021-07-19 RX ORDER — EMPAGLIFLOZIN 25 MG/1
25 TABLET, FILM COATED ORAL
Qty: 90 | Refills: 0 | Status: DISCONTINUED | COMMUNITY
Start: 2021-04-08 | End: 2021-07-19

## 2021-07-19 RX ORDER — AMLODIPINE BESYLATE 5 MG/1
5 TABLET ORAL
Qty: 90 | Refills: 0 | Status: DISCONTINUED | COMMUNITY
Start: 2020-12-15 | End: 2021-07-19

## 2021-07-20 ENCOUNTER — NON-APPOINTMENT (OUTPATIENT)
Age: 81
End: 2021-07-20

## 2021-07-20 NOTE — ASSESSMENT
[FreeTextEntry1] : 1. Bronchitis, COPD exacerbation.\par Mild.\par treat with azithromycin\par Hold on steroids if able\par Return precautions given\par Treat co-exsiting thrush with nystatin\par \par 2. Thrush\par Nystatin\par Probably from inhaler use\par \par 3. HTN\par At goal\par Continue current\par \par 4. T2DM\par A1c due\par Seeing Dr. Perlman this week\par \par 5. Hypothyroidism \par TFTs due\par

## 2021-07-20 NOTE — HISTORY OF PRESENT ILLNESS
[FreeTextEntry1] : cough, f/u HTN, HLD [de-identified] : Kiarra present for follow up. \par \par She has  had productive cough x 3-4 days. It is not improving on its own. She has some runny nose. No f/c. Some subjective SOB. She has recently started Jardiance due to low blood sugars and has f/u with Endo this week. She denies chest pain, palpitations, LE edema, or dizziness.

## 2021-07-20 NOTE — PHYSICAL EXAM
[No Acute Distress] : no acute distress [Well Nourished] : well nourished [Well Developed] : well developed [Well-Appearing] : well-appearing [Normal Sclera/Conjunctiva] : normal sclera/conjunctiva [Normal Outer Ear/Nose] : the outer ears and nose were normal in appearance [Normal Nasal Mucosa] : the nasal mucosa was normal [No Respiratory Distress] : no respiratory distress  [No Accessory Muscle Use] : no accessory muscle use [Clear to Auscultation] : lungs were clear to auscultation bilaterally [Normal Rate] : normal rate  [Regular Rhythm] : with a regular rhythm [Normal S1, S2] : normal S1 and S2 [No Murmur] : no murmur heard [No Edema] : there was no peripheral edema [Normal Gait] : normal gait [Alert and Oriented x3] : oriented to person, place, and time [de-identified] : + cerumen impaction, L>R. Oropharynx has white plaques.

## 2021-07-20 NOTE — REVIEW OF SYSTEMS
[Fever] : no fever [Chills] : no chills [Fatigue] : no fatigue [Night Sweats] : no night sweats [Nasal Discharge] : nasal discharge [Sore Throat] : no sore throat [Chest Pain] : no chest pain [Palpitations] : no palpitations [Wheezing] : no wheezing [Cough] : cough [Abdominal Pain] : no abdominal pain

## 2021-07-21 ENCOUNTER — APPOINTMENT (OUTPATIENT)
Dept: OTOLARYNGOLOGY | Facility: CLINIC | Age: 81
End: 2021-07-21

## 2021-07-22 DIAGNOSIS — R06.02 SHORTNESS OF BREATH: ICD-10-CM

## 2021-07-22 LAB
25(OH)D3 SERPL-MCNC: 32.7 NG/ML
ALBUMIN SERPL ELPH-MCNC: 4.5 G/DL
ALP BLD-CCNC: 86 U/L
ALT SERPL-CCNC: 14 U/L
ANION GAP SERPL CALC-SCNC: 14 MMOL/L
APPEARANCE: CLEAR
AST SERPL-CCNC: 14 U/L
BACTERIA: NEGATIVE
BASOPHILS # BLD AUTO: 0.06 K/UL
BASOPHILS NFR BLD AUTO: 0.5 %
BILIRUB SERPL-MCNC: 0.7 MG/DL
BILIRUBIN URINE: NEGATIVE
BLOOD URINE: NEGATIVE
BUN SERPL-MCNC: 10 MG/DL
CALCIUM SERPL-MCNC: 9.7 MG/DL
CHLORIDE SERPL-SCNC: 99 MMOL/L
CHOLEST SERPL-MCNC: 200 MG/DL
CO2 SERPL-SCNC: 23 MMOL/L
COLOR: YELLOW
CREAT SERPL-MCNC: 0.65 MG/DL
CREAT SPEC-SCNC: 184 MG/DL
EOSINOPHIL # BLD AUTO: 0.37 K/UL
EOSINOPHIL NFR BLD AUTO: 2.9 %
ESTIMATED AVERAGE GLUCOSE: 183 MG/DL
FRUCTOSAMINE SERPL-MCNC: 327 UMOL/L
GLUCOSE QUALITATIVE U: NEGATIVE
GLUCOSE SERPL-MCNC: 179 MG/DL
HBA1C MFR BLD HPLC: 8 %
HCT VFR BLD CALC: 44.6 %
HDLC SERPL-MCNC: 53 MG/DL
HGB BLD-MCNC: 13.9 G/DL
HYALINE CASTS: 3 /LPF
IMM GRANULOCYTES NFR BLD AUTO: 0.4 %
KETONES URINE: NEGATIVE
LDLC SERPL CALC-MCNC: 110 MG/DL
LEUKOCYTE ESTERASE URINE: NEGATIVE
LYMPHOCYTES # BLD AUTO: 1.09 K/UL
LYMPHOCYTES NFR BLD AUTO: 8.7 %
MAN DIFF?: NORMAL
MCHC RBC-ENTMCNC: 28.8 PG
MCHC RBC-ENTMCNC: 31.2 GM/DL
MCV RBC AUTO: 92.3 FL
MICROALBUMIN 24H UR DL<=1MG/L-MCNC: 21.3 MG/DL
MICROALBUMIN/CREAT 24H UR-RTO: 116 MG/G
MICROSCOPIC-UA: NORMAL
MONOCYTES # BLD AUTO: 1.2 K/UL
MONOCYTES NFR BLD AUTO: 9.5 %
NEUTROPHILS # BLD AUTO: 9.82 K/UL
NEUTROPHILS NFR BLD AUTO: 78 %
NITRITE URINE: NEGATIVE
NONHDLC SERPL-MCNC: 148 MG/DL
PH URINE: 7
PLATELET # BLD AUTO: 368 K/UL
POTASSIUM SERPL-SCNC: 4.3 MMOL/L
PROT SERPL-MCNC: 7 G/DL
PROTEIN URINE: ABNORMAL
RBC # BLD: 4.83 M/UL
RBC # FLD: 14.2 %
RED BLOOD CELLS URINE: 9 /HPF
SODIUM SERPL-SCNC: 136 MMOL/L
SPECIFIC GRAVITY URINE: 1.02
SQUAMOUS EPITHELIAL CELLS: 5 /HPF
T4 FREE SERPL-MCNC: 1.1 NG/DL
TRIGL SERPL-MCNC: 187 MG/DL
TSH SERPL-ACNC: 1.6 UIU/ML
UROBILINOGEN URINE: NORMAL
WBC # FLD AUTO: 12.59 K/UL
WHITE BLOOD CELLS URINE: 3 /HPF

## 2021-07-22 RX ORDER — ALBUTEROL SULFATE 90 UG/1
108 (90 BASE) INHALANT RESPIRATORY (INHALATION) EVERY 6 HOURS
Qty: 1 | Refills: 3 | Status: ACTIVE | COMMUNITY
Start: 2021-07-22 | End: 1900-01-01

## 2021-07-23 ENCOUNTER — APPOINTMENT (OUTPATIENT)
Dept: INTERNAL MEDICINE | Facility: CLINIC | Age: 81
End: 2021-07-23
Payer: MEDICARE

## 2021-07-23 VITALS
BODY MASS INDEX: 25.51 KG/M2 | WEIGHT: 135 LBS | OXYGEN SATURATION: 95 % | SYSTOLIC BLOOD PRESSURE: 130 MMHG | DIASTOLIC BLOOD PRESSURE: 66 MMHG | HEART RATE: 77 BPM

## 2021-07-23 PROCEDURE — 99214 OFFICE O/P EST MOD 30 MIN: CPT

## 2021-07-23 PROCEDURE — 99072 ADDL SUPL MATRL&STAF TM PHE: CPT

## 2021-07-24 NOTE — ASSESSMENT
[FreeTextEntry1] : 1. Bronchitis/early pneumonia and COPD exacerbation\par COPD exacerbation- prednisone 40mg x5 days, continue nystatin to prevent thrush recurrence\par ?Mild pleuritis in setting of developing pneumonia though CXR unrevealing for acute process\par Complete CAP coverage, will add Augmentin. allergic to cephalosporins. \par Return next week.\par Return precautions given

## 2021-07-24 NOTE — PHYSICAL EXAM
[No Acute Distress] : no acute distress [Well Nourished] : well nourished [Well Developed] : well developed [Well-Appearing] : well-appearing [Normal Sclera/Conjunctiva] : normal sclera/conjunctiva [Normal Oropharynx] : the oropharynx was normal [No Respiratory Distress] : no respiratory distress  [No Accessory Muscle Use] : no accessory muscle use [Normal Rate] : normal rate  [Regular Rhythm] : with a regular rhythm [Normal S1, S2] : normal S1 and S2 [No Murmur] : no murmur heard [No Edema] : there was no peripheral edema [de-identified] : Thrush resolved [de-identified] : Diffuse wheezing throughout  [de-identified] : No calf asymmetry, edema, or erythema.

## 2021-07-24 NOTE — REVIEW OF SYSTEMS
[Fever] : no fever [Chills] : no chills [Night Sweats] : no night sweats [Nasal Discharge] : no nasal discharge [Sore Throat] : no sore throat [Chest Pain] : no chest pain [Palpitations] : no palpitations [Shortness Of Breath] : shortness of breath [Wheezing] : wheezing [Cough] : cough [Abdominal Pain] : no abdominal pain [Headache] : no headache

## 2021-07-24 NOTE — HISTORY OF PRESENT ILLNESS
[FreeTextEntry8] : Kiarra returns for follow up. Seen last week for bronchitis and given zpack and albuterol prn for wheezing. Over last few days, feels like cough is persistent, though not productive, and still has some ongoing shortness of breath. No f/c, chest pain, or palpitations. She went for ordered CXR this AM which showed no acute changes. Also, has some R mid back pain with coughing. No hemoptysis. No leg swelling.

## 2021-07-26 ENCOUNTER — APPOINTMENT (OUTPATIENT)
Dept: OTOLARYNGOLOGY | Facility: CLINIC | Age: 81
End: 2021-07-26

## 2021-07-30 ENCOUNTER — APPOINTMENT (OUTPATIENT)
Dept: INTERNAL MEDICINE | Facility: CLINIC | Age: 81
End: 2021-07-30
Payer: MEDICARE

## 2021-07-30 VITALS
WEIGHT: 134 LBS | HEART RATE: 78 BPM | BODY MASS INDEX: 25.32 KG/M2 | DIASTOLIC BLOOD PRESSURE: 56 MMHG | SYSTOLIC BLOOD PRESSURE: 109 MMHG | OXYGEN SATURATION: 95 %

## 2021-07-30 LAB
BILIRUB UR QL STRIP: NORMAL
GLUCOSE UR-MCNC: NORMAL
HCG UR QL: NORMAL EU/DL
HGB UR QL STRIP.AUTO: NORMAL
KETONES UR-MCNC: ABNORMAL
LEUKOCYTE ESTERASE UR QL STRIP: ABNORMAL
NITRITE UR QL STRIP: NORMAL
PH UR STRIP: 6.5
PROT UR STRIP-MCNC: ABNORMAL
SP GR UR STRIP: 1.02

## 2021-07-30 PROCEDURE — 99406 BEHAV CHNG SMOKING 3-10 MIN: CPT

## 2021-07-30 PROCEDURE — 99214 OFFICE O/P EST MOD 30 MIN: CPT | Mod: 25

## 2021-07-30 PROCEDURE — 81003 URINALYSIS AUTO W/O SCOPE: CPT | Mod: QW

## 2021-07-30 NOTE — ASSESSMENT
[FreeTextEntry1] : 1. Bronchitis/CAP/COPD exacerbation\par Follow up with pulm\par Again,advised to quit smoking\par Discussed progressive damage to lungs and frequent exacerbations \par Has pulm f/u\par \par 2. Thrush\par From steroids\par Covel with nystatin swish and swallow\par \par 3. Abnormal UA\par Repeat UA and culture\par If +blood, needs Uro workup given smoking hx

## 2021-07-30 NOTE — REVIEW OF SYSTEMS
[Fever] : no fever [Chills] : no chills [Fatigue] : no fatigue [Night Sweats] : no night sweats [Chest Pain] : no chest pain [Palpitations] : no palpitations [Shortness Of Breath] : shortness of breath [Wheezing] : wheezing [Cough] : no cough [Abdominal Pain] : no abdominal pain [Hematuria] : no hematuria

## 2021-07-30 NOTE — HISTORY OF PRESENT ILLNESS
[de-identified] : Kiarra returns for follow up.\par \par CXR unrevealing for acute process. Treated as CAP and COPD exacerbation. Thrush came back with PO prednisone. She is feeling better. She still has some mild SOB, but cough is better. She has pulm follow up scheduled. \par \par She also had microscopic blood in her urine.  [FreeTextEntry1] : f/u bronchitis, abnormal UA

## 2021-07-30 NOTE — PHYSICAL EXAM
[No Acute Distress] : no acute distress [Well Nourished] : well nourished [Well Developed] : well developed [Well-Appearing] : well-appearing [No Accessory Muscle Use] : no accessory muscle use [No Respiratory Distress] : no respiratory distress  [Normal Rate] : normal rate  [Regular Rhythm] : with a regular rhythm [Normal S1, S2] : normal S1 and S2 [No Murmur] : no murmur heard [No Edema] : there was no peripheral edema [Soft] : abdomen soft [Non Tender] : non-tender [No CVA Tenderness] : no CVA  tenderness [de-identified] : Oropharynx and tongue with white patches and plaques. [de-identified] : Intermittent wheezing, though better air movement.

## 2021-08-05 ENCOUNTER — APPOINTMENT (OUTPATIENT)
Dept: INTERNAL MEDICINE | Facility: CLINIC | Age: 81
End: 2021-08-05
Payer: MEDICARE

## 2021-08-05 VITALS
WEIGHT: 134 LBS | DIASTOLIC BLOOD PRESSURE: 71 MMHG | BODY MASS INDEX: 25.32 KG/M2 | SYSTOLIC BLOOD PRESSURE: 114 MMHG | HEART RATE: 75 BPM | OXYGEN SATURATION: 97 % | TEMPERATURE: 98.1 F

## 2021-08-05 DIAGNOSIS — B37.0 CANDIDAL STOMATITIS: ICD-10-CM

## 2021-08-05 DIAGNOSIS — Z11.59 ENCOUNTER FOR SCREENING FOR OTHER VIRAL DISEASES: ICD-10-CM

## 2021-08-05 LAB
APPEARANCE: CLEAR
BACTERIA UR CULT: NORMAL
BACTERIA: NEGATIVE
BILIRUBIN URINE: NEGATIVE
BLOOD URINE: NEGATIVE
CALCIUM OXALATE CRYSTALS: ABNORMAL
COLOR: YELLOW
GLUCOSE QUALITATIVE U: NEGATIVE
HYALINE CASTS: 5 /LPF
KETONES URINE: NORMAL
LEUKOCYTE ESTERASE URINE: ABNORMAL
MICROSCOPIC-UA: NORMAL
NITRITE URINE: NEGATIVE
PH URINE: 6.5
PROTEIN URINE: ABNORMAL
RED BLOOD CELLS URINE: 4 /HPF
SPECIFIC GRAVITY URINE: 1.02
SQUAMOUS EPITHELIAL CELLS: 7 /HPF
URINE COMMENTS: NORMAL
UROBILINOGEN URINE: NORMAL
WHITE BLOOD CELLS URINE: 9 /HPF

## 2021-08-05 PROCEDURE — 99213 OFFICE O/P EST LOW 20 MIN: CPT

## 2021-08-05 NOTE — PHYSICAL EXAM
[No Acute Distress] : no acute distress [Well Nourished] : well nourished [Well Developed] : well developed [Well-Appearing] : well-appearing [No Respiratory Distress] : no respiratory distress  [No Accessory Muscle Use] : no accessory muscle use [Clear to Auscultation] : lungs were clear to auscultation bilaterally [Normal Rate] : normal rate  [Regular Rhythm] : with a regular rhythm [Normal S1, S2] : normal S1 and S2 [No Murmur] : no murmur heard [No Edema] : there was no peripheral edema [de-identified] : Thursh improved, minimal residual white plaque

## 2021-08-05 NOTE — HISTORY OF PRESENT ILLNESS
[FreeTextEntry1] : f/u thrush, COPD exacerbation [de-identified] : Kiarra presents for follow up.\par \par She is feeling better from respiratory perspective. She still has some fatigue. She had some loose stools. Feeling better overall. Thrush is also better.

## 2021-08-05 NOTE — REVIEW OF SYSTEMS
[Fever] : no fever [Chills] : no chills [Fatigue] : no fatigue [Night Sweats] : no night sweats [Chest Pain] : no chest pain [Palpitations] : no palpitations [Shortness Of Breath] : shortness of breath [Wheezing] : wheezing [Cough] : cough

## 2021-08-06 LAB — SARS-COV-2 N GENE NPH QL NAA+PROBE: NOT DETECTED

## 2021-08-10 ENCOUNTER — NON-APPOINTMENT (OUTPATIENT)
Age: 81
End: 2021-08-10

## 2021-08-15 NOTE — ED ADULT TRIAGE NOTE - DIRECT TO ROOM CARE INITIATED:
29-Jun-2017 02:07

## 2021-09-14 LAB
BASOPHILS # BLD AUTO: 0.06 K/UL
BASOPHILS NFR BLD AUTO: 0.5 %
EOSINOPHIL # BLD AUTO: 0.35 K/UL
EOSINOPHIL NFR BLD AUTO: 3.2 %
HCT VFR BLD CALC: 39.8 %
HGB BLD-MCNC: 13 G/DL
IMM GRANULOCYTES NFR BLD AUTO: 0.5 %
LYMPHOCYTES # BLD AUTO: 2.16 K/UL
LYMPHOCYTES NFR BLD AUTO: 19.6 %
MAN DIFF?: NORMAL
MCHC RBC-ENTMCNC: 28.5 PG
MCHC RBC-ENTMCNC: 32.7 GM/DL
MCV RBC AUTO: 87.3 FL
MONOCYTES # BLD AUTO: 0.92 K/UL
MONOCYTES NFR BLD AUTO: 8.3 %
NEUTROPHILS # BLD AUTO: 7.49 K/UL
NEUTROPHILS NFR BLD AUTO: 67.9 %
PLATELET # BLD AUTO: 372 K/UL
RBC # BLD: 4.56 M/UL
RBC # FLD: 14.4 %
WBC # FLD AUTO: 11.04 K/UL

## 2021-09-16 ENCOUNTER — APPOINTMENT (OUTPATIENT)
Dept: INTERNAL MEDICINE | Facility: CLINIC | Age: 81
End: 2021-09-16
Payer: MEDICARE

## 2021-09-16 VITALS
WEIGHT: 134 LBS | DIASTOLIC BLOOD PRESSURE: 73 MMHG | OXYGEN SATURATION: 96 % | HEART RATE: 74 BPM | SYSTOLIC BLOOD PRESSURE: 120 MMHG | BODY MASS INDEX: 25.32 KG/M2

## 2021-09-16 DIAGNOSIS — R31.29 OTHER MICROSCOPIC HEMATURIA: ICD-10-CM

## 2021-09-16 LAB
BILIRUB UR QL STRIP: NORMAL
GLUCOSE UR-MCNC: NORMAL
HCG UR QL: NORMAL EU/DL
HGB UR QL STRIP.AUTO: NORMAL
KETONES UR-MCNC: NORMAL
LEUKOCYTE ESTERASE UR QL STRIP: NORMAL
NITRITE UR QL STRIP: NORMAL
PH UR STRIP: 7.5
PROT UR STRIP-MCNC: NORMAL
SP GR UR STRIP: 1.01

## 2021-09-16 PROCEDURE — 99214 OFFICE O/P EST MOD 30 MIN: CPT | Mod: 25

## 2021-09-16 PROCEDURE — 81003 URINALYSIS AUTO W/O SCOPE: CPT | Mod: QW

## 2021-09-16 NOTE — HISTORY OF PRESENT ILLNESS
[FreeTextEntry1] : FOLLOW UP TO REPEAT U/A  [de-identified] : PATIENT HAD RECENT U/A IN JULY WHICH SHOWED 9 RBC/ HPF , REPEAT IN AUGUST 4 RBC / HPF , SHE DENIES ANY URINARY \par SYMPTOMS , NO GROSS HEMATURIA , WAS TOLD OF HAVING SAND IN HER URINE MANY YEARS AGO

## 2021-09-17 LAB
APPEARANCE: CLEAR
BACTERIA: NEGATIVE
BILIRUBIN URINE: NEGATIVE
BLOOD URINE: NEGATIVE
COLOR: COLORLESS
GLUCOSE QUALITATIVE U: NEGATIVE
HYALINE CASTS: 0 /LPF
KETONES URINE: NEGATIVE
LEUKOCYTE ESTERASE URINE: NEGATIVE
MICROSCOPIC-UA: NORMAL
NITRITE URINE: NEGATIVE
PH URINE: 7
PROTEIN URINE: NEGATIVE
RED BLOOD CELLS URINE: 1 /HPF
SPECIFIC GRAVITY URINE: 1.01
SQUAMOUS EPITHELIAL CELLS: 1 /HPF
UROBILINOGEN URINE: NORMAL
WHITE BLOOD CELLS URINE: 0 /HPF

## 2021-10-04 ENCOUNTER — RX RENEWAL (OUTPATIENT)
Age: 81
End: 2021-10-04

## 2021-10-16 ENCOUNTER — RX RENEWAL (OUTPATIENT)
Age: 81
End: 2021-10-16

## 2021-11-02 ENCOUNTER — APPOINTMENT (OUTPATIENT)
Dept: OTOLARYNGOLOGY | Facility: CLINIC | Age: 81
End: 2021-11-02
Payer: MEDICARE

## 2021-11-02 VITALS
BODY MASS INDEX: 28.47 KG/M2 | DIASTOLIC BLOOD PRESSURE: 76 MMHG | SYSTOLIC BLOOD PRESSURE: 120 MMHG | WEIGHT: 145 LBS | HEIGHT: 60 IN | HEART RATE: 74 BPM

## 2021-11-02 DIAGNOSIS — Q31.8 OTHER CONGENITAL MALFORMATIONS OF LARYNX: ICD-10-CM

## 2021-11-02 PROCEDURE — 99213 OFFICE O/P EST LOW 20 MIN: CPT | Mod: 25

## 2021-11-02 PROCEDURE — 31575 DIAGNOSTIC LARYNGOSCOPY: CPT

## 2021-11-02 RX ORDER — AZITHROMYCIN 250 MG/1
250 TABLET, FILM COATED ORAL
Qty: 1 | Refills: 0 | Status: COMPLETED | COMMUNITY
Start: 2021-07-19 | End: 2021-11-02

## 2021-11-02 RX ORDER — FLUCONAZOLE 150 MG/1
150 TABLET ORAL
Qty: 1 | Refills: 0 | Status: COMPLETED | COMMUNITY
Start: 2021-08-05 | End: 2021-11-02

## 2021-11-02 RX ORDER — CHLORHEXIDINE GLUCONATE, 0.12% ORAL RINSE 1.2 MG/ML
0.12 SOLUTION DENTAL
Qty: 1419 | Refills: 0 | Status: ACTIVE | COMMUNITY
Start: 2021-10-30

## 2021-11-02 RX ORDER — NEBULIZER AND COMPRESSOR
EACH MISCELLANEOUS
Qty: 1 | Refills: 0 | Status: ACTIVE | COMMUNITY
Start: 2021-08-18

## 2021-11-02 RX ORDER — AMOXICILLIN AND CLAVULANATE POTASSIUM 875; 125 MG/1; MG/1
875-125 TABLET, COATED ORAL
Qty: 14 | Refills: 0 | Status: COMPLETED | COMMUNITY
Start: 2021-07-23 | End: 2021-11-02

## 2021-11-02 NOTE — PROCEDURE
[Hoarseness] : hoarseness not clearly evaluated by indirect laryngoscopy [Dysphagia] : dysphagia not clearly evaluated by indirect laryngoscopy [Complicated Symptoms] : complicated symptoms requiring more thorough examination than provided by mirror [de-identified] : Procedure: Flexible Fiberoptic Laryngoscopy: Risks, benefits, and alternatives of flexible endoscopy were explained to the patient. The patient gave oral consent to proceed. The flexible scope was inserted into the right nasal cavity and advanced towards the nasopharynx. Visualized mucosa over the turbinates and septum were as describe above.  The nasopharynx was clear. Oropharyngeal walls were symmetric and mobile without lesion, mass, or edema. Hypopharynx was also without lesion or edema. Larynx was mobile without lesions. Edema left side of arytenoid. Asymmetry of left and right VC. Hypervascularly on left cord. Overhanging false cord on left side. Base of tongue was within normal limits.

## 2021-11-02 NOTE — ASSESSMENT
[FreeTextEntry1] : Reviewed and reconciled medications, allergies, PMHx, PSHx, SocHx, FMHx. 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 \par Hypervascularly on left cord. 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 \par Edema left arytenoid\par \par Plan:\par Cerumen removed. Flexible Fiberoptic Laryngoscopy. CT of voice box. FU after test. \par

## 2021-11-02 NOTE — PHYSICAL EXAM
[Hearing Shaw Test (Tuning Fork On Forehead)] : no lateralization of tone [] : septum deviated to the right [Normal] : lingual tonsils are normal [Midline] : trachea located in midline position [Removed] : palatine tonsils previously removed [FreeTextEntry8] : Cerumen removed via curettage.  [FreeTextEntry9] : Cerumen removed via curettage.  [de-identified] : mild turbinate hypertrophy [de-identified] : slightly dry mouth

## 2021-11-02 NOTE — ADDENDUM
[FreeTextEntry1] : Documented by Jason Meza acting as scribe for Dr. Johns on 11/02/2021.\par \par All Medical record entries made by the Scribe were at my, Dr. Johns, direction and personally dictated by me on 11/02/2021. I have reviewed the chart and agree that the record accurately reflects my personal performance of the history, physical exam, assessment and plan. I have also personally directed, reviewed, and agreed with the discharge instructions.

## 2021-11-02 NOTE — HISTORY OF PRESENT ILLNESS
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 Pt presents today for follow up on her dysphagia. Pt has been going to speech and swallowing therapy, and notes that she has noticed improvement in her swallowing. Pt still experiences some episodes of dysphagia but is able to resolve them quickly with ingestion of fluid. Pt reports that she drinks plenty of fluid. Pt requested Flonase refill.

## 2021-11-09 ENCOUNTER — APPOINTMENT (OUTPATIENT)
Dept: OTOLARYNGOLOGY | Facility: CLINIC | Age: 81
End: 2021-11-09
Payer: MEDICARE

## 2021-11-09 VITALS
HEIGHT: 60 IN | BODY MASS INDEX: 28.47 KG/M2 | DIASTOLIC BLOOD PRESSURE: 82 MMHG | HEART RATE: 68 BPM | SYSTOLIC BLOOD PRESSURE: 148 MMHG | WEIGHT: 145 LBS

## 2021-11-09 PROCEDURE — 99214 OFFICE O/P EST MOD 30 MIN: CPT

## 2021-11-09 PROCEDURE — 92557 COMPREHENSIVE HEARING TEST: CPT

## 2021-11-09 PROCEDURE — 92550 TYMPANOMETRY & REFLEX THRESH: CPT

## 2021-11-09 NOTE — ADDENDUM
[FreeTextEntry1] : Documented by Jason Meza acting as scribe for Dr. Johns on 11/09/2021.\par \par All Medical record entries made by the Scribe were at my, Dr. Johns, direction and personally dictated by me on 11/09/2021. I have reviewed the chart and agree that the record accurately reflects my personal performance of the history, physical exam, assessment and plan. I have also personally directed, reviewed, and agreed with the discharge instructions.

## 2021-11-09 NOTE — DATA REVIEWED
[de-identified] : - type As tymp in the right, type B in the left\par right: hearing within/bordering normal 250-8000hz\par left: mild mixed loss rising to ess within/borderling normal sloping back to mild loss at 8khz\par rec: 1) ent f/u 2) re-eval as per md

## 2021-11-09 NOTE — ASSESSMENT
[FreeTextEntry1] : Reviewed and reconciled medications, allergies, PMHx, PSHx, SocHx, FMHx. 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 Extensive opacification of left mastoid air cells and partial opacification of the left middle ear cleft. \par \par Audio: Type As tymp in the right, type B in the left. right: hearing within/bordering normal 250-8000hz\par left: mild mixed loss rising to ess within/borderling normal sloping back to mild loss at 8khz. right 100% discrim at 60db, left 92% discrim at 60db.\par \par Plan:\par Reviewed CT soft tissue neck. See neurologist for nerve testing. cervical spur Audio - results interpreted by Dr. Johns and reviewed with the patient. US thyroid in 6 months. CT Temporal bone at Replaced by [facility]. FU after test.

## 2021-11-17 ENCOUNTER — APPOINTMENT (OUTPATIENT)
Dept: OTOLARYNGOLOGY | Facility: CLINIC | Age: 81
End: 2021-11-17
Payer: MEDICARE

## 2021-11-17 VITALS
SYSTOLIC BLOOD PRESSURE: 122 MMHG | DIASTOLIC BLOOD PRESSURE: 75 MMHG | WEIGHT: 145 LBS | HEART RATE: 73 BPM | BODY MASS INDEX: 28.47 KG/M2 | HEIGHT: 60 IN

## 2021-11-17 DIAGNOSIS — E04.1 NONTOXIC SINGLE THYROID NODULE: ICD-10-CM

## 2021-11-17 DIAGNOSIS — H60.312 DIFFUSE OTITIS EXTERNA, LEFT EAR: ICD-10-CM

## 2021-11-17 PROCEDURE — 99213 OFFICE O/P EST LOW 20 MIN: CPT | Mod: 25

## 2021-11-17 PROCEDURE — 69433 CREATE EARDRUM OPENING: CPT

## 2021-11-17 NOTE — HISTORY OF PRESENT ILLNESS
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 Pt presents today for the results of her CT temporal bone results.Pt reports that she has hearing loss in left ear. Pt added that she has holes from teeth removal, that hasn't closed up yet. On antibiotics for teeth and has f/u scheduled with them tomorrow to have bone cleaned. Pt has numbness in her arms.

## 2021-11-17 NOTE — ASSESSMENT
[FreeTextEntry1] : Reviewed and reconciled medications, allergies, PMHx, PSHx, SocHx, FMHx. 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 Extensive opacification of left mastoid air cells and partial opacification of the left middle ear cleft. \par \par Audio 11/9/21: Type As tymp in the right, type B in the left. right: hearing within/bordering normal 250-8000hz\par left: mild mixed loss rising to ess within/borderling normal sloping back to mild loss at 8khz. right 100% discrim at 60db, left 92% discrim at 60db.\par \par CT IAC 11/11/21\par extensive opacification of left mastoid air cells, opacification post aspect left epitympanum\par Opacification of left Prussak's space, bony dehiscence along the apex of the left semicircular canal\par \par Left ear retracted slightly sclerotic ear drum\par \par Left Tympanostomy tube placed in office:see procedure note:\par \par Plan:\par Reviewed CT IAC with patient\par Discussed placing tube in left ear to drain fluid from middle ear/mastoid in office vs OR\par Left Tympanostomy tube placed today\par Keep ear dry and start Tobradex 4 drops BID Left ear only\par Follow-up in 2 weeks\par

## 2021-11-17 NOTE — PROCEDURE
[FreeTextEntry1] : Left Tympanostomy tube [FreeTextEntry2] : Left mastoid opacification [FreeTextEntry3] : Written consent given by patient\par Left ear drum anesthetized with Phenol\par An incision was made with myringotomy blade\par Fluid suctioned from inner ear\par An Harrison beveled 1.14mm tube was shortened and placed\par Ciprodex drops placed\par Entire procedure tolerated well.

## 2021-11-23 ENCOUNTER — RX RENEWAL (OUTPATIENT)
Age: 81
End: 2021-11-23

## 2021-11-24 ENCOUNTER — APPOINTMENT (OUTPATIENT)
Dept: OTOLARYNGOLOGY | Facility: CLINIC | Age: 81
End: 2021-11-24

## 2021-12-01 ENCOUNTER — APPOINTMENT (OUTPATIENT)
Dept: OTOLARYNGOLOGY | Facility: CLINIC | Age: 81
End: 2021-12-01
Payer: MEDICARE

## 2021-12-01 VITALS
DIASTOLIC BLOOD PRESSURE: 74 MMHG | WEIGHT: 145 LBS | SYSTOLIC BLOOD PRESSURE: 126 MMHG | HEART RATE: 75 BPM | HEIGHT: 60 IN | BODY MASS INDEX: 28.47 KG/M2

## 2021-12-01 DIAGNOSIS — H65.22 CHRONIC SEROUS OTITIS MEDIA, LEFT EAR: ICD-10-CM

## 2021-12-01 PROCEDURE — 99212 OFFICE O/P EST SF 10 MIN: CPT

## 2021-12-01 NOTE — PHYSICAL EXAM
[Normal] : the left mastoid was normal [Hearing Loss Right Only] : diminished [Hearing Loss Left Only] : diminished [de-identified] : tube in place

## 2021-12-01 NOTE — HISTORY OF PRESENT ILLNESS
[de-identified] : sp insertion o tube left ear. no pain now. lonely painful with drops. dental extractions still not healed seeing dentist tomorrow. sp carcinoma of tongue treated with radiation.

## 2021-12-30 ENCOUNTER — APPOINTMENT (OUTPATIENT)
Dept: INTERNAL MEDICINE | Facility: CLINIC | Age: 81
End: 2021-12-30
Payer: MEDICARE

## 2021-12-30 VITALS — HEART RATE: 82 BPM | DIASTOLIC BLOOD PRESSURE: 78 MMHG | OXYGEN SATURATION: 97 % | SYSTOLIC BLOOD PRESSURE: 139 MMHG

## 2021-12-30 DIAGNOSIS — J40 BRONCHITIS, NOT SPECIFIED AS ACUTE OR CHRONIC: ICD-10-CM

## 2021-12-30 DIAGNOSIS — B00.9 ACUTE TONSILLITIS DUE TO OTHER SPECIFIED ORGANISMS: ICD-10-CM

## 2021-12-30 DIAGNOSIS — J06.9 ACUTE UPPER RESPIRATORY INFECTION, UNSPECIFIED: ICD-10-CM

## 2021-12-30 DIAGNOSIS — J03.80 ACUTE TONSILLITIS DUE TO OTHER SPECIFIED ORGANISMS: ICD-10-CM

## 2021-12-30 PROCEDURE — 87880 STREP A ASSAY W/OPTIC: CPT | Mod: QW

## 2021-12-30 PROCEDURE — 99214 OFFICE O/P EST MOD 30 MIN: CPT | Mod: 25

## 2021-12-30 NOTE — HISTORY OF PRESENT ILLNESS
[FreeTextEntry1] : C/O COUGH  [de-identified] : PATIENT C/O COUGH , SLIGHT SORE THROAT X SEVERAL DAYS .\par FELT ACHES , NO DOCUMENTED FEVER BUT TOOK TYLENOL \par NO CHEST PAINS , SOB \par DID NOT RECEIVE COVID BOOSTER YET

## 2021-12-30 NOTE — PHYSICAL EXAM
[No Acute Distress] : no acute distress [Normal Sclera/Conjunctiva] : normal sclera/conjunctiva [Normal Oropharynx] : the oropharynx was normal [Normal TMs] : both tympanic membranes were normal [No Lymphadenopathy] : no lymphadenopathy [No Edema] : there was no peripheral edema [Normal] : soft, non-tender, non-distended, no masses palpated, no HSM and normal bowel sounds

## 2021-12-31 LAB
FLUAV SPEC QL CULT: NORMAL
FLUBV AG SPEC QL IA: NORMAL
S PYO AG SPEC QL IA: NORMAL

## 2022-01-03 LAB — SARS-COV-2 N GENE NPH QL NAA+PROBE: DETECTED

## 2022-01-11 ENCOUNTER — APPOINTMENT (OUTPATIENT)
Dept: OTOLARYNGOLOGY | Facility: CLINIC | Age: 82
End: 2022-01-11
Payer: MEDICARE

## 2022-01-11 VITALS
BODY MASS INDEX: 28.47 KG/M2 | DIASTOLIC BLOOD PRESSURE: 73 MMHG | WEIGHT: 145 LBS | HEART RATE: 73 BPM | SYSTOLIC BLOOD PRESSURE: 119 MMHG | HEIGHT: 60 IN

## 2022-01-11 DIAGNOSIS — H70.12 CHRONIC MASTOIDITIS, LEFT EAR: ICD-10-CM

## 2022-01-11 DIAGNOSIS — H92.02 OTALGIA, LEFT EAR: ICD-10-CM

## 2022-01-11 PROCEDURE — 99213 OFFICE O/P EST LOW 20 MIN: CPT

## 2022-01-11 PROCEDURE — 92557 COMPREHENSIVE HEARING TEST: CPT

## 2022-01-11 PROCEDURE — 92567 TYMPANOMETRY: CPT

## 2022-01-11 RX ORDER — ALBUTEROL SULFATE 2.5 MG/3ML
(2.5 MG/3ML) SOLUTION RESPIRATORY (INHALATION)
Qty: 360 | Refills: 0 | Status: COMPLETED | COMMUNITY
Start: 2021-08-18 | End: 2022-01-11

## 2022-01-11 RX ORDER — AMOXICILLIN 500 MG/1
500 TABLET, FILM COATED ORAL
Refills: 0 | Status: COMPLETED | COMMUNITY
Start: 2021-11-17 | End: 2022-01-11

## 2022-01-11 RX ORDER — AMOXICILLIN 500 MG/1
500 CAPSULE ORAL
Qty: 21 | Refills: 0 | Status: COMPLETED | COMMUNITY
Start: 2021-10-30 | End: 2022-01-11

## 2022-01-11 RX ORDER — AZITHROMYCIN 250 MG/1
250 TABLET, FILM COATED ORAL
Qty: 1 | Refills: 0 | Status: COMPLETED | COMMUNITY
Start: 2021-12-30 | End: 2022-01-11

## 2022-01-11 NOTE — PHYSICAL EXAM
[Hearing Shaw Test (Tuning Fork On Forehead)] : no lateralization of tone [Normal] : salivary glands are normal [FreeTextEntry6] : auricles nontender [FreeTextEntry7] : auricles nontender [FreeTextEntry9] : minimal Cerumen removed via curettage.  [de-identified] : tube in perfect position [de-identified] : nontender [de-identified] : nontender [FreeTextEntry2] : Sinuses nontender to percussion. Sensations intact.

## 2022-01-11 NOTE — HISTORY OF PRESENT ILLNESS
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 Pt reports intermittent sharp left ear pain, less so on the right. Pt states that the pain resolves within a second of occurring. Pt is no longer using any drops. Pt states that the hearing is ok. Pt recently got over COVID infection spanning from 12/30/21 to 1/09/22.

## 2022-01-11 NOTE — ASSESSMENT
[FreeTextEntry1] : Reviewed and reconciled medications, allergies, PMHx, PSHx, SocHx, FMHx. 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 \par \par Plan:\par Cerumen removed. Audio - results interpreted by Dr. Johns and reviewed with the patient. Keep ear dry. FU 2-3 months

## 2022-01-11 NOTE — DATA REVIEWED
[de-identified] : Tymps: Right: Type AS/C  Left: Type B\par Right: -8khz\par Left: Moderate risint to WNL to Mild -8khz\par Recs: 1) ENT f/u 2) re-eval in conjunction w/ medical management

## 2022-01-11 NOTE — ADDENDUM
[FreeTextEntry1] : Documented by Jason Meza acting as scribe for Dr. Johns on 01/11/2022.\par \par All Medical record entries made by the Scribe were at my, Dr. Johns, direction and personally dictated by me on 01/11/2022. I have reviewed the chart and agree that the record accurately reflects my personal performance of the history, physical exam, assessment and plan. I have also personally directed, reviewed, and agreed with the discharge instructions.

## 2022-01-18 ENCOUNTER — APPOINTMENT (OUTPATIENT)
Dept: INTERNAL MEDICINE | Facility: CLINIC | Age: 82
End: 2022-01-18
Payer: MEDICARE

## 2022-01-18 VITALS
HEIGHT: 60 IN | TEMPERATURE: 97.7 F | OXYGEN SATURATION: 98 % | RESPIRATION RATE: 12 BRPM | DIASTOLIC BLOOD PRESSURE: 69 MMHG | BODY MASS INDEX: 26.31 KG/M2 | WEIGHT: 134 LBS | HEART RATE: 73 BPM | SYSTOLIC BLOOD PRESSURE: 108 MMHG

## 2022-01-18 DIAGNOSIS — U07.1 COVID-19: ICD-10-CM

## 2022-01-18 PROCEDURE — 99213 OFFICE O/P EST LOW 20 MIN: CPT

## 2022-01-18 NOTE — HISTORY OF PRESENT ILLNESS
[FreeTextEntry8] : Kiarra presents for follow up of COVID. She had COVID, initial test positive on 12/30.She had cough, sputum production, and myalgias. She still has some cough and sputum production which she has been treating with OTC cough syrup. Feeling better.

## 2022-01-18 NOTE — PHYSICAL EXAM
[No Acute Distress] : no acute distress [Well Nourished] : well nourished [Well Developed] : well developed [No Respiratory Distress] : no respiratory distress  [No Accessory Muscle Use] : no accessory muscle use [Clear to Auscultation] : lungs were clear to auscultation bilaterally [Normal Rate] : normal rate  [Regular Rhythm] : with a regular rhythm [Normal S1, S2] : normal S1 and S2 [No Edema] : there was no peripheral edema

## 2022-01-18 NOTE — REVIEW OF SYSTEMS
[Fever] : no fever [Chills] : no chills [Fatigue] : no fatigue [Night Sweats] : no night sweats [Chest Pain] : no chest pain [Palpitations] : no palpitations [Shortness Of Breath] : no shortness of breath [Cough] : cough [Abdominal Pain] : no abdominal pain

## 2022-01-20 LAB — SARS-COV-2 N GENE NPH QL NAA+PROBE: DETECTED

## 2022-02-10 ENCOUNTER — APPOINTMENT (OUTPATIENT)
Dept: INTERNAL MEDICINE | Facility: CLINIC | Age: 82
End: 2022-02-10
Payer: MEDICARE

## 2022-02-10 ENCOUNTER — RESULT CHARGE (OUTPATIENT)
Age: 82
End: 2022-02-10

## 2022-02-10 VITALS
HEART RATE: 75 BPM | WEIGHT: 133 LBS | HEIGHT: 60 IN | OXYGEN SATURATION: 95 % | DIASTOLIC BLOOD PRESSURE: 79 MMHG | SYSTOLIC BLOOD PRESSURE: 141 MMHG | BODY MASS INDEX: 26.11 KG/M2 | RESPIRATION RATE: 12 BRPM

## 2022-02-10 DIAGNOSIS — R82.90 UNSPECIFIED ABNORMAL FINDINGS IN URINE: ICD-10-CM

## 2022-02-10 PROCEDURE — 99213 OFFICE O/P EST LOW 20 MIN: CPT

## 2022-02-11 NOTE — HISTORY OF PRESENT ILLNESS
[FreeTextEntry8] : Kiarra presents with gross hematuria. Developed gross hematuria this AM. She had no pain at time. She denies frequency, urgency, dysuria, abdominal, or flank pain. She has had microscopic hematuria in past with normal sono but was referred to Urology (though never went). She did not have any beets. She is on baby ASA, though does not have stents.

## 2022-02-11 NOTE — ASSESSMENT
[FreeTextEntry1] : 1. Gross hematuria\par POCT UA with + blood, urine is grossly red. Does have moderate LE\par Send UA, culture\par Repeat renal and bladder sono\par Start Bactrim DS empirically while awaiting culture\par Referred and stressed importance of Urology follow up, especially with her smoking hx\par She will go to ED if bleeding worsens or develops abdominal/flank pain\par Hold ASA for now until bleeding lessens. Aim to resume ASAP

## 2022-02-11 NOTE — PHYSICAL EXAM
[No Acute Distress] : no acute distress [Well Nourished] : well nourished [Well Developed] : well developed [Well-Appearing] : well-appearing [No Respiratory Distress] : no respiratory distress  [No Accessory Muscle Use] : no accessory muscle use [Clear to Auscultation] : lungs were clear to auscultation bilaterally [Normal Rate] : normal rate  [Regular Rhythm] : with a regular rhythm [Normal S1, S2] : normal S1 and S2 [No Murmur] : no murmur heard [No Edema] : there was no peripheral edema [Soft] : abdomen soft [Non-distended] : non-distended [Normal Bowel Sounds] : normal bowel sounds [No CVA Tenderness] : no CVA  tenderness [No Spinal Tenderness] : no spinal tenderness [Alert and Oriented x3] : oriented to person, place, and time [de-identified] : mild suprapubic discomfort on palpation

## 2022-02-11 NOTE — REVIEW OF SYSTEMS
[Fever] : no fever [Chills] : no chills [Night Sweats] : no night sweats [Chest Pain] : no chest pain [Palpitations] : no palpitations [Shortness Of Breath] : no shortness of breath [Abdominal Pain] : no abdominal pain [Dysuria] : no dysuria [Incontinence] : no incontinence [Hematuria] : hematuria [Frequency] : no frequency [Back Pain] : no back pain

## 2022-02-14 DIAGNOSIS — R31.9 HEMATURIA, UNSPECIFIED: ICD-10-CM

## 2022-02-14 LAB
APPEARANCE: ABNORMAL
BACTERIA UR CULT: ABNORMAL
BACTERIA: ABNORMAL
BILIRUBIN URINE: NEGATIVE
BLOOD URINE: ABNORMAL
COLOR: ABNORMAL
GLUCOSE QUALITATIVE U: NEGATIVE
HYALINE CASTS: 0 /LPF
KETONES URINE: NEGATIVE
LEUKOCYTE ESTERASE URINE: ABNORMAL
MICROSCOPIC-UA: NORMAL
NITRITE URINE: NEGATIVE
PH URINE: 6.5
PROTEIN URINE: ABNORMAL
RED BLOOD CELLS URINE: >720 /HPF
SPECIFIC GRAVITY URINE: 1.01
SQUAMOUS EPITHELIAL CELLS: 1 /HPF
UROBILINOGEN URINE: NORMAL
WHITE BLOOD CELLS URINE: 549 /HPF

## 2022-02-14 RX ORDER — SULFAMETHOXAZOLE AND TRIMETHOPRIM 800; 160 MG/1; MG/1
800-160 TABLET ORAL TWICE DAILY
Qty: 14 | Refills: 0 | Status: DISCONTINUED | COMMUNITY
Start: 2022-02-10 | End: 2022-02-14

## 2022-02-22 ENCOUNTER — RX RENEWAL (OUTPATIENT)
Age: 82
End: 2022-02-22

## 2022-03-02 ENCOUNTER — RX RENEWAL (OUTPATIENT)
Age: 82
End: 2022-03-02

## 2022-03-18 PROCEDURE — 0: CUSTOM

## 2022-03-31 ENCOUNTER — RX RENEWAL (OUTPATIENT)
Age: 82
End: 2022-03-31

## 2022-04-11 PROBLEM — Z11.59 SCREENING FOR VIRAL DISEASE: Status: ACTIVE | Noted: 2021-08-05

## 2022-05-03 ENCOUNTER — APPOINTMENT (OUTPATIENT)
Dept: OTOLARYNGOLOGY | Facility: CLINIC | Age: 82
End: 2022-05-03
Payer: MEDICARE

## 2022-05-03 VITALS
HEART RATE: 75 BPM | WEIGHT: 132 LBS | HEIGHT: 60 IN | SYSTOLIC BLOOD PRESSURE: 114 MMHG | BODY MASS INDEX: 25.91 KG/M2 | DIASTOLIC BLOOD PRESSURE: 69 MMHG

## 2022-05-03 DIAGNOSIS — Z96.22 MYRINGOTOMY TUBE(S) STATUS: ICD-10-CM

## 2022-05-03 PROCEDURE — 31575 DIAGNOSTIC LARYNGOSCOPY: CPT

## 2022-05-03 PROCEDURE — 99213 OFFICE O/P EST LOW 20 MIN: CPT | Mod: 25

## 2022-05-03 RX ORDER — ALPRAZOLAM 0.25 MG/1
0.25 TABLET ORAL
Qty: 15 | Refills: 0 | Status: DISCONTINUED | COMMUNITY
Start: 2020-04-06 | End: 2022-05-03

## 2022-05-03 RX ORDER — SODIUM FLUORIDE 1.1 G/100G
1.1 GEL, DENTIFRICE ORAL
Qty: 51 | Refills: 0 | Status: DISCONTINUED | COMMUNITY
Start: 2021-03-12 | End: 2022-05-03

## 2022-05-03 RX ORDER — NYSTATIN AND TRIAMCINOLONE ACETONIDE 100000; 1 MG/G; MG/G
100000-0.1 CREAM TOPICAL
Qty: 30 | Refills: 0 | Status: DISCONTINUED | COMMUNITY
Start: 2021-07-12 | End: 2022-05-03

## 2022-05-03 RX ORDER — TOBRAMYCIN AND DEXAMETHASONE 3; 1 MG/ML; MG/ML
0.3-0.1 SUSPENSION/ DROPS OPHTHALMIC
Qty: 1 | Refills: 4 | Status: DISCONTINUED | COMMUNITY
Start: 2021-11-17 | End: 2022-05-03

## 2022-05-03 RX ORDER — PAROXETINE HYDROCHLORIDE 40 MG/1
40 TABLET, FILM COATED ORAL DAILY
Refills: 0 | Status: DISCONTINUED | COMMUNITY
Start: 2020-08-02 | End: 2022-05-03

## 2022-05-03 RX ORDER — PAROXETINE HYDROCHLORIDE 10 MG/1
10 TABLET, FILM COATED ORAL
Qty: 90 | Refills: 0 | Status: DISCONTINUED | COMMUNITY
Start: 2021-06-09 | End: 2022-05-03

## 2022-05-03 RX ORDER — PSYLLIUM HUSK 0.4 G
CAPSULE ORAL
Refills: 0 | Status: ACTIVE | COMMUNITY

## 2022-05-03 RX ORDER — NYSTATIN 100000 [USP'U]/ML
100000 SUSPENSION ORAL
Qty: 1 | Refills: 0 | Status: DISCONTINUED | COMMUNITY
Start: 2021-07-19 | End: 2022-05-03

## 2022-05-03 RX ORDER — SODIUM FLUORIDE 6 MG/ML
1.1 PASTE DENTAL
Qty: 100 | Refills: 0 | Status: DISCONTINUED | COMMUNITY
Start: 2021-01-05 | End: 2022-05-03

## 2022-05-03 RX ORDER — AMOXICILLIN AND CLAVULANATE POTASSIUM 875; 125 MG/1; MG/1
875-125 TABLET, COATED ORAL
Qty: 14 | Refills: 0 | Status: DISCONTINUED | COMMUNITY
Start: 2022-02-14 | End: 2022-05-03

## 2022-05-03 RX ORDER — PREDNISONE 20 MG/1
20 TABLET ORAL DAILY
Qty: 10 | Refills: 0 | Status: DISCONTINUED | COMMUNITY
Start: 2021-07-23 | End: 2022-05-03

## 2022-05-03 RX ORDER — OXYCODONE AND ACETAMINOPHEN 5; 325 MG/1; MG/1
5-325 TABLET ORAL
Qty: 12 | Refills: 0 | Status: DISCONTINUED | COMMUNITY
Start: 2021-08-27 | End: 2022-05-03

## 2022-05-03 RX ORDER — ASCORBIC ACID 500 MG
TABLET ORAL
Refills: 0 | Status: ACTIVE | COMMUNITY

## 2022-05-03 RX ORDER — PERPHENAZINE AND AMITRIPTYLINE HYDROCHLORIDE 2; 25 MG/1; MG/1
2-25 TABLET, FILM COATED ORAL
Qty: 90 | Refills: 0 | Status: DISCONTINUED | COMMUNITY
Start: 2020-03-16 | End: 2022-05-03

## 2022-05-03 RX ORDER — LANSOPRAZOLE 30 MG/1
30 CAPSULE, DELAYED RELEASE ORAL
Qty: 90 | Refills: 0 | Status: DISCONTINUED | COMMUNITY
Start: 2020-03-30 | End: 2022-05-03

## 2022-05-03 NOTE — HISTORY OF PRESENT ILLNESS
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 Pt states that her mouth, jaw, and ear have all been doing alright. Reports continued dry mouth s/p radiation. Pt is getting a set of bottom dentures.

## 2022-05-03 NOTE — ASSESSMENT
[FreeTextEntry1] : Reviewed and reconciled medications, allergies, PMHx, PSHx, SocHx, FMHx. 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 Keep left ear dry. Flexible Fiberoptic Laryngoscopy. FU 3-4 months.

## 2022-05-03 NOTE — PROCEDURE
[None] : none [Flexible Endoscope] : examined with the flexible endoscope [de-identified] : Procedure: Flexible Fiberoptic Laryngoscopy: Risks, benefits, and alternatives of flexible endoscopy were explained to the patient. The patient gave oral consent to proceed. The flexible scope was inserted into the left nasal cavity and advanced towards the nasopharynx. Visualized mucosa over the turbinates and septum were as describe above.  The nasopharynx was clear. Oropharyngeal walls were symmetric and mobile without lesion, mass, or edema. Hypopharynx was also without lesion or edema. Mild laryngeal edema.  Supraglottic structures were free of mass, and asymmetry. True vocal folds were white without mass or lesion. Base of tongue with no masses, growths, or lymphoid tissue.  [de-identified] : h/o BOT cancer

## 2022-05-03 NOTE — PHYSICAL EXAM
[Hearing Shaw Test (Tuning Fork On Forehead)] : no lateralization of tone [] : septum deviated to the right [Normal] : lingual tonsils are normal [Midline] : trachea located in midline position [Removed] : palatine tonsils previously removed [FreeTextEntry8] : Cerumen removed via curettage. [FreeTextEntry9] : Cerumen removed via curettage. dry canal.  [de-identified] : tube in place [de-identified] : nose is dry [de-identified] : mouth is dry. exposed bone posterioly on right mandible

## 2022-05-03 NOTE — ADDENDUM
[FreeTextEntry1] : Documented by Jason Meza acting as scribe for Dr. Johns on 05/03/2022.\par \par All Medical record entries made by the Scribe were at my, Dr. Johns, direction and personally dictated by me on 05/03/2022. I have reviewed the chart and agree that the record accurately reflects my personal performance of the history, physical exam, assessment and plan. I have also personally directed, reviewed, and agreed with the discharge instructions.

## 2022-06-11 ENCOUNTER — NON-APPOINTMENT (OUTPATIENT)
Age: 82
End: 2022-06-11

## 2022-06-11 ENCOUNTER — APPOINTMENT (OUTPATIENT)
Dept: ORTHOPEDIC SURGERY | Facility: CLINIC | Age: 82
End: 2022-06-11

## 2022-06-11 VITALS
DIASTOLIC BLOOD PRESSURE: 76 MMHG | BODY MASS INDEX: 25.32 KG/M2 | HEART RATE: 67 BPM | HEIGHT: 60 IN | WEIGHT: 129 LBS | SYSTOLIC BLOOD PRESSURE: 139 MMHG

## 2022-06-11 DIAGNOSIS — M25.561 PAIN IN RIGHT KNEE: ICD-10-CM

## 2022-06-11 PROCEDURE — 73562 X-RAY EXAM OF KNEE 3: CPT | Mod: RT

## 2022-06-11 PROCEDURE — 99203 OFFICE O/P NEW LOW 30 MIN: CPT

## 2022-06-11 RX ORDER — VITAMIN E ACID SUCCINATE 268 MG
TABLET ORAL
Refills: 0 | Status: ACTIVE | COMMUNITY

## 2022-06-11 RX ORDER — LINAGLIPTIN 5 MG/1
TABLET, FILM COATED ORAL
Refills: 0 | Status: DISCONTINUED | COMMUNITY
End: 2022-06-11

## 2022-06-11 RX ORDER — LEVOTHYROXINE SODIUM 0.07 MG/1
75 TABLET ORAL
Refills: 0 | Status: DISCONTINUED | COMMUNITY
Start: 2021-04-15 | End: 2022-06-11

## 2022-06-21 RX ORDER — ATORVASTATIN CALCIUM 20 MG/1
20 TABLET, FILM COATED ORAL
Qty: 90 | Refills: 1 | Status: DISCONTINUED | COMMUNITY
Start: 2020-03-30 | End: 2022-06-21

## 2022-07-12 ENCOUNTER — LABORATORY RESULT (OUTPATIENT)
Age: 82
End: 2022-07-12

## 2022-07-12 ENCOUNTER — APPOINTMENT (OUTPATIENT)
Dept: ORTHOPEDIC SURGERY | Facility: CLINIC | Age: 82
End: 2022-07-12

## 2022-07-12 VITALS — HEART RATE: 71 BPM | DIASTOLIC BLOOD PRESSURE: 70 MMHG | SYSTOLIC BLOOD PRESSURE: 111 MMHG

## 2022-07-12 PROCEDURE — 20610 DRAIN/INJ JOINT/BURSA W/O US: CPT

## 2022-07-12 PROCEDURE — 99213 OFFICE O/P EST LOW 20 MIN: CPT | Mod: 25

## 2022-07-12 RX ORDER — LIDOCAINE HYDROCHLORIDE 10 MG/ML
1 INJECTION, SOLUTION INFILTRATION; PERINEURAL
Refills: 0 | Status: COMPLETED | OUTPATIENT
Start: 2022-07-12

## 2022-07-12 RX ORDER — METHYLPRED ACET/NACL,ISO-OS/PF 80 MG/ML
80 VIAL (ML) INJECTION
Qty: 1 | Refills: 0 | Status: COMPLETED | OUTPATIENT
Start: 2022-07-12

## 2022-07-12 RX ADMIN — METHYLPREDNISOLONE ACETATE 1 MG/ML: 80 INJECTION, SUSPENSION INTRA-ARTICULAR; INTRALESIONAL; INTRAMUSCULAR; SOFT TISSUE at 00:00

## 2022-07-12 RX ADMIN — LIDOCAINE HYDROCHLORIDE 10 %: 10 INJECTION, SOLUTION INFILTRATION; PERINEURAL at 00:00

## 2022-07-12 RX ADMIN — LIDOCAINE HYDROCHLORIDE 3 %: 10 INJECTION, SOLUTION INFILTRATION; PERINEURAL at 00:00

## 2022-08-01 ENCOUNTER — RX RENEWAL (OUTPATIENT)
Age: 82
End: 2022-08-01

## 2022-08-12 DIAGNOSIS — M25.461 EFFUSION, RIGHT KNEE: ICD-10-CM

## 2022-08-25 ENCOUNTER — APPOINTMENT (OUTPATIENT)
Dept: INTERNAL MEDICINE | Facility: CLINIC | Age: 82
End: 2022-08-25

## 2022-08-25 ENCOUNTER — NON-APPOINTMENT (OUTPATIENT)
Age: 82
End: 2022-08-25

## 2022-08-25 VITALS
SYSTOLIC BLOOD PRESSURE: 97 MMHG | HEART RATE: 65 BPM | DIASTOLIC BLOOD PRESSURE: 77 MMHG | OXYGEN SATURATION: 98 % | WEIGHT: 135 LBS | BODY MASS INDEX: 26.37 KG/M2

## 2022-08-25 DIAGNOSIS — R60.0 LOCALIZED EDEMA: ICD-10-CM

## 2022-08-25 PROCEDURE — 99214 OFFICE O/P EST MOD 30 MIN: CPT | Mod: 25

## 2022-08-25 PROCEDURE — 93000 ELECTROCARDIOGRAM COMPLETE: CPT | Mod: 59

## 2022-08-25 PROCEDURE — 36415 COLL VENOUS BLD VENIPUNCTURE: CPT

## 2022-08-25 NOTE — PHYSICAL EXAM
[No Acute Distress] : no acute distress [Normal Sclera/Conjunctiva] : normal sclera/conjunctiva [No Lymphadenopathy] : no lymphadenopathy [Thyroid Normal, No Nodules] : the thyroid was normal and there were no nodules present [No Carotid Bruits] : no carotid bruits [No Abdominal Bruit] : a ~M bruit was not heard ~T in the abdomen [No Edema] : there was no peripheral edema [Normal] : soft, non-tender, non-distended, no masses palpated, no HSM and normal bowel sounds [Normal Supraclavicular Nodes] : no supraclavicular lymphadenopathy [Normal Posterior Cervical Nodes] : no posterior cervical lymphadenopathy [Normal Anterior Cervical Nodes] : no anterior cervical lymphadenopathy [Normal Inguinal Nodes] : no inguinal lymphadenopathy [No CVA Tenderness] : no CVA  tenderness [de-identified] : Positive venous varicosities bilaterally

## 2022-08-25 NOTE — HISTORY OF PRESENT ILLNESS
[FreeTextEntry1] : Lower extremity swelling [de-identified] : Patient with history of hypertension, hyperlipidemia, and type 2 diabetes mellitus complaining of intermittent lower extremity swelling that she has noted towards the end of the day.  She denies any chest pain, shortness of breath or palpitations and states compliance with low-sodium diet.  She also suffers from bilateral osteoarthritis of the knees right greater than left, there is no prior history of DVT.\par She states that the swelling improves by morning.

## 2022-08-26 LAB
ALBUMIN SERPL ELPH-MCNC: 4.2 G/DL
ALP BLD-CCNC: 62 U/L
ALT SERPL-CCNC: 20 U/L
ANION GAP SERPL CALC-SCNC: 11 MMOL/L
AST SERPL-CCNC: 16 U/L
BILIRUB SERPL-MCNC: 0.6 MG/DL
BUN SERPL-MCNC: 15 MG/DL
CALCIUM SERPL-MCNC: 9.6 MG/DL
CHLORIDE SERPL-SCNC: 99 MMOL/L
CO2 SERPL-SCNC: 25 MMOL/L
CREAT SERPL-MCNC: 0.66 MG/DL
EGFR: 89 ML/MIN/1.73M2
GLUCOSE SERPL-MCNC: 111 MG/DL
POTASSIUM SERPL-SCNC: 4.6 MMOL/L
PROT SERPL-MCNC: 6.4 G/DL
SODIUM SERPL-SCNC: 135 MMOL/L

## 2022-08-29 LAB
APPEARANCE: CLEAR
BACTERIA: NEGATIVE
BILIRUBIN URINE: NEGATIVE
BLOOD URINE: NEGATIVE
COLOR: NORMAL
GLUCOSE QUALITATIVE U: NEGATIVE
HYALINE CASTS: 0 /LPF
KETONES URINE: NEGATIVE
LEUKOCYTE ESTERASE URINE: NEGATIVE
MICROSCOPIC-UA: NORMAL
NITRITE URINE: NEGATIVE
PH URINE: 7
PROTEIN URINE: NORMAL
RED BLOOD CELLS URINE: 2 /HPF
SPECIFIC GRAVITY URINE: 1.02
SQUAMOUS EPITHELIAL CELLS: 2 /HPF
UROBILINOGEN URINE: NORMAL
WHITE BLOOD CELLS URINE: 1 /HPF

## 2022-09-06 ENCOUNTER — APPOINTMENT (OUTPATIENT)
Dept: OTOLARYNGOLOGY | Facility: CLINIC | Age: 82
End: 2022-09-06

## 2022-09-06 VITALS
WEIGHT: 135 LBS | DIASTOLIC BLOOD PRESSURE: 77 MMHG | HEIGHT: 60 IN | SYSTOLIC BLOOD PRESSURE: 124 MMHG | BODY MASS INDEX: 26.5 KG/M2 | HEART RATE: 65 BPM

## 2022-09-06 DIAGNOSIS — D10.1 BENIGN NEOPLASM OF TONGUE: ICD-10-CM

## 2022-09-06 DIAGNOSIS — T17.320D: ICD-10-CM

## 2022-09-06 DIAGNOSIS — H61.22 IMPACTED CERUMEN, LEFT EAR: ICD-10-CM

## 2022-09-06 PROCEDURE — 41110 EXCISION OF TONGUE LESION: CPT

## 2022-09-06 PROCEDURE — 99214 OFFICE O/P EST MOD 30 MIN: CPT | Mod: 25

## 2022-09-06 PROCEDURE — 31575 DIAGNOSTIC LARYNGOSCOPY: CPT

## 2022-09-06 PROCEDURE — 69210 REMOVE IMPACTED EAR WAX UNI: CPT

## 2022-09-06 RX ORDER — AMITRIPTYLINE HYDROCHLORIDE 25 MG/1
25 TABLET ORAL
Refills: 0 | Status: DISCONTINUED | COMMUNITY
End: 2022-09-06

## 2022-09-06 RX ORDER — LANSOPRAZOLE 30 MG/1
30 CAPSULE, DELAYED RELEASE ORAL
Refills: 0 | Status: DISCONTINUED | COMMUNITY
End: 2022-09-06

## 2022-09-06 RX ORDER — LOSARTAN POTASSIUM 50 MG/1
50 TABLET, FILM COATED ORAL
Qty: 90 | Refills: 0 | Status: DISCONTINUED | COMMUNITY
End: 2022-09-06

## 2022-09-06 NOTE — ASSESSMENT
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 Flexible laryngoscopy. Excision of tongue fibroma. If bleeding at home, use ice and pressure. Wait 30 minutes before eating. FEEST ordered. FU after test.

## 2022-09-06 NOTE — PROCEDURE
[Dysphagia] : dysphagia not clearly evaluated by indirect laryngoscopy [None] : none [Flexible Endoscope] : examined with the flexible endoscope [FreeTextEntry1] : Excision of tongue fibroma [FreeTextEntry2] : Large fibroma on tongue [FreeTextEntry3] : Procedure: Excision of tongue Fibroma Procedure was explained to the patient. The risks, benefits, and alternatives were discussed with the patient. Patient gave oral consent to continue. Hurricane topical anesthetic was sprayed onto the tongue. A needle was used to inject 1% local anesthetic. Excised the fibroma with sharp scissors. Specimen gathered and sent to lab. stopped bleeding with silver nitrate cautery and pressure. Pt tolerated the procedure well. Pt was given post procedure instructions.\par \par  [de-identified] : Procedure: Flexible Fiberoptic Laryngoscopy: Risks, benefits, and alternatives of flexible laryngoscopy were explained to the patient. The patient gave oral consent to proceed. The flexible scope was inserted into the left nasal cavity and advanced towards the nasopharynx. Visualized mucosa over the turbinates and septum were as described as above. The nasopharynx was clear. Oropharyngeal walls were symmetric and mobile without lesion, mass, or edema. Hypopharynx was also without lesion or edema. Larynx was mobile without lesions. Supraglottic structures were free of edema, mass, and asymmetry. True vocal folds were white without mass or lesion. Base of tongue was within normal limits.  [de-identified] : choking on foods

## 2022-09-06 NOTE — HISTORY OF PRESENT ILLNESS
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 Pt notes she chokes very often when eating solids. Pt notes she feels it stuck in the top of her throat, rather than chest. Pt notes she chokes even when she cuts up the food very small. Pt notes she has had this problem since she had cancer 10 years ago. Pt notes she drinks lots of water, but still has a dry mouth.

## 2022-09-06 NOTE — ADDENDUM
[FreeTextEntry1] : Documented by Geri Luu acting as scribe for Dr. Johns on 09/06/2022.\par \par All Medical record entries made by the scribe were at my, Dr. Johns,direction and personally dictated by me on 09/06/2022. I have reviewed the chart and agree that the record accurately reflects my personal performance of the history, physical exam, assessment and plan. I have also personally directed, reviewed, and agreed with the discharge instructions.

## 2022-09-06 NOTE — PHYSICAL EXAM
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

## 2022-09-09 LAB — CORE LAB BIOPSY: NORMAL

## 2022-09-16 ENCOUNTER — APPOINTMENT (OUTPATIENT)
Dept: OTOLARYNGOLOGY | Facility: CLINIC | Age: 82
End: 2022-09-16

## 2022-09-16 PROCEDURE — 92611 MOTION FLUOROSCOPY/SWALLOW: CPT | Mod: GN

## 2022-09-27 ENCOUNTER — RX RENEWAL (OUTPATIENT)
Age: 82
End: 2022-09-27

## 2022-10-01 RX ORDER — OMEPRAZOLE 40 MG/1
40 CAPSULE, DELAYED RELEASE ORAL
Qty: 90 | Refills: 3 | Status: DISCONTINUED | COMMUNITY
Start: 2020-07-07 | End: 2022-10-01

## 2022-10-04 ENCOUNTER — APPOINTMENT (OUTPATIENT)
Dept: OTOLARYNGOLOGY | Facility: CLINIC | Age: 82
End: 2022-10-04

## 2022-10-04 VITALS
WEIGHT: 134 LBS | DIASTOLIC BLOOD PRESSURE: 71 MMHG | SYSTOLIC BLOOD PRESSURE: 134 MMHG | HEART RATE: 69 BPM | BODY MASS INDEX: 26.31 KG/M2 | HEIGHT: 60 IN

## 2022-10-04 DIAGNOSIS — M50.90 CERVICAL DISC DISORDER, UNSPECIFIED, UNSPECIFIED CERVICAL REGION: ICD-10-CM

## 2022-10-04 PROCEDURE — 99214 OFFICE O/P EST MOD 30 MIN: CPT

## 2022-10-04 NOTE — CONSULT LETTER
[Dear  ___] : Dear  [unfilled], [Courtesy Letter:] : I had the pleasure of seeing your patient, [unfilled], in my office today. [Please see my note below.] : Please see my note below. [Consult Closing:] : Thank you very much for allowing me to participate in the care of this patient.  If you have any questions, please do not hesitate to contact me. [Sincerely,] : Sincerely, [FreeTextEntry1] : Rafael Johns MD FACS

## 2022-10-04 NOTE — HISTORY OF PRESENT ILLNESS
[de-identified] : Pt. 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 Patient presents today for her FEES results. Patient states she is going to have knee surgery soon.

## 2022-10-04 NOTE — ASSESSMENT
[FreeTextEntry1] : Reviewed and reconciled medications, allergies, PMHx, PSHx, SocHx, FMHx \par \par Pt. 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 Patient presents today for her FEES results.  Patient states she is going to have knee surgery soon.\par \par FEES 09/16/22:\par -delayed pharyngeal swallow\par -mild stasis\par -if drinks quickly she will penetration into her larynx and cough, but no aspiration\par -recommended swallowing therapy\par \par Plan: Speech and swallow therapy. Stay upright. Don't talk while eating. Eat slower. Cut food smaller. Drink water between bites. Massage neck while eating or hot tea before eating to relax the muscles. FU PRN\par

## 2022-10-15 ENCOUNTER — NON-APPOINTMENT (OUTPATIENT)
Age: 82
End: 2022-10-15

## 2022-10-15 ENCOUNTER — APPOINTMENT (OUTPATIENT)
Dept: ORTHOPEDIC SURGERY | Facility: CLINIC | Age: 82
End: 2022-10-15

## 2022-10-15 VITALS
HEIGHT: 60 IN | HEART RATE: 71 BPM | WEIGHT: 144 LBS | DIASTOLIC BLOOD PRESSURE: 66 MMHG | SYSTOLIC BLOOD PRESSURE: 130 MMHG | BODY MASS INDEX: 28.27 KG/M2

## 2022-10-15 DIAGNOSIS — M25.559 PAIN IN UNSPECIFIED HIP: ICD-10-CM

## 2022-10-15 PROCEDURE — 20610 DRAIN/INJ JOINT/BURSA W/O US: CPT | Mod: RT

## 2022-10-15 PROCEDURE — 73562 X-RAY EXAM OF KNEE 3: CPT | Mod: RT

## 2022-10-15 PROCEDURE — 99212 OFFICE O/P EST SF 10 MIN: CPT | Mod: 25

## 2022-10-15 RX ORDER — METHYLPRED ACET/NACL,ISO-OS/PF 40 MG/ML
40 VIAL (ML) INJECTION
Qty: 1 | Refills: 0 | Status: COMPLETED | OUTPATIENT
Start: 2022-10-15

## 2022-10-15 RX ORDER — LIDOCAINE HYDROCHLORIDE 5 MG/ML
0.5 INJECTION, SOLUTION INFILTRATION; PERINEURAL
Refills: 0 | Status: COMPLETED | OUTPATIENT
Start: 2022-10-15

## 2022-10-15 RX ADMIN — LIDOCAINE HYDROCHLORIDE 3 %: 5 INJECTION, SOLUTION INFILTRATION at 00:00

## 2022-10-15 RX ADMIN — METHYLPREDNISOLONE ACETATE 2 MG/ML: 40 INJECTION, SUSPENSION INTRA-ARTICULAR; INTRALESIONAL; INTRAMUSCULAR; SOFT TISSUE at 00:00

## 2022-10-18 ENCOUNTER — APPOINTMENT (OUTPATIENT)
Dept: INTERNAL MEDICINE | Facility: CLINIC | Age: 82
End: 2022-10-18

## 2022-10-18 VITALS
OXYGEN SATURATION: 98 % | WEIGHT: 132 LBS | DIASTOLIC BLOOD PRESSURE: 72 MMHG | SYSTOLIC BLOOD PRESSURE: 149 MMHG | BODY MASS INDEX: 25.78 KG/M2 | HEART RATE: 71 BPM

## 2022-10-18 VITALS — SYSTOLIC BLOOD PRESSURE: 126 MMHG | DIASTOLIC BLOOD PRESSURE: 70 MMHG

## 2022-10-18 PROCEDURE — 99213 OFFICE O/P EST LOW 20 MIN: CPT

## 2022-10-18 NOTE — ASSESSMENT
[FreeTextEntry1] : 1. GERD\par Discussed at length of risk of H2 blockers versus PPI for both acute and chronic therapy.  Discussed there is also risk for severe untreated reflux/gastritis.  As such, she will switch back to omeprazole 40 mg a day.  She will remain on a PPI until she sees GI again in December.\par Check CBC, CMP for renal function, UA\par To return or call with any change in symptoms

## 2022-10-18 NOTE — PHYSICAL EXAM
[No Acute Distress] : no acute distress [Well Nourished] : well nourished [Well Developed] : well developed [Well-Appearing] : well-appearing [Normal Sclera/Conjunctiva] : normal sclera/conjunctiva [No Respiratory Distress] : no respiratory distress  [No Accessory Muscle Use] : no accessory muscle use [Clear to Auscultation] : lungs were clear to auscultation bilaterally [Normal Rate] : normal rate  [Regular Rhythm] : with a regular rhythm [Normal S1, S2] : normal S1 and S2 [No Murmur] : no murmur heard [No Edema] : there was no peripheral edema [Soft] : abdomen soft [Non Tender] : non-tender [Non-distended] : non-distended [Normal Bowel Sounds] : normal bowel sounds

## 2022-10-18 NOTE — REVIEW OF SYSTEMS
[Fever] : no fever [Chills] : no chills [Fatigue] : no fatigue [Recent Change In Weight] : ~T no recent weight change [Chest Pain] : no chest pain [Shortness Of Breath] : no shortness of breath [Abdominal Pain] : no abdominal pain [Nausea] : no nausea [Vomiting] : no vomiting [Heartburn] : heartburn

## 2022-10-18 NOTE — HISTORY OF PRESENT ILLNESS
[FreeTextEntry8] : Kiarra presents today due to concern of worsening GERD.  She recently saw  and had endoscopy.  She was told she had gastritis.  She was put on PPI.  She was then told by her pharmacy that she should avoid PPI due to risk of kidney injury.  As such, we discussed switching to an H2 blocker as long as it was okay with GI.  She that she was unable to get in contact with GI.  Acid reflux symptoms worsen on famotidine.  She felt heartburn and sense of regurgitation when bending or leaning forward.  She would like to go back to a PPI.  She does better with capsules as tablets can sometimes be difficult to swallow and cause irritation.  No change in bowel habits.  No blood in stools.

## 2022-10-25 LAB
ALBUMIN SERPL ELPH-MCNC: 4.4 G/DL
ALP BLD-CCNC: 70 U/L
ALT SERPL-CCNC: 23 U/L
ANION GAP SERPL CALC-SCNC: 14 MMOL/L
APPEARANCE: CLEAR
AST SERPL-CCNC: 18 U/L
BACTERIA: NEGATIVE
BASOPHILS # BLD AUTO: 0.04 K/UL
BASOPHILS NFR BLD AUTO: 0.3 %
BILIRUB SERPL-MCNC: 0.4 MG/DL
BILIRUBIN URINE: NEGATIVE
BLOOD URINE: NEGATIVE
BUN SERPL-MCNC: 21 MG/DL
CALCIUM SERPL-MCNC: 9.7 MG/DL
CHLORIDE SERPL-SCNC: 99 MMOL/L
CO2 SERPL-SCNC: 22 MMOL/L
COLOR: NORMAL
CREAT SERPL-MCNC: 0.64 MG/DL
EGFR: 90 ML/MIN/1.73M2
EOSINOPHIL # BLD AUTO: 0.1 K/UL
EOSINOPHIL NFR BLD AUTO: 0.8 %
GLUCOSE QUALITATIVE U: NEGATIVE
GLUCOSE SERPL-MCNC: 134 MG/DL
HCT VFR BLD CALC: 38.6 %
HGB BLD-MCNC: 12.9 G/DL
HYALINE CASTS: 0 /LPF
IMM GRANULOCYTES NFR BLD AUTO: 0.5 %
KETONES URINE: NEGATIVE
LEUKOCYTE ESTERASE URINE: ABNORMAL
LYMPHOCYTES # BLD AUTO: 2.28 K/UL
LYMPHOCYTES NFR BLD AUTO: 17.7 %
MAN DIFF?: NORMAL
MCHC RBC-ENTMCNC: 28.2 PG
MCHC RBC-ENTMCNC: 33.4 GM/DL
MCV RBC AUTO: 84.3 FL
MICROSCOPIC-UA: NORMAL
MONOCYTES # BLD AUTO: 1.31 K/UL
MONOCYTES NFR BLD AUTO: 10.2 %
NEUTROPHILS # BLD AUTO: 9.1 K/UL
NEUTROPHILS NFR BLD AUTO: 70.5 %
NITRITE URINE: NEGATIVE
PH URINE: 7
PLATELET # BLD AUTO: 443 K/UL
POTASSIUM SERPL-SCNC: 4.3 MMOL/L
PROT SERPL-MCNC: 7.1 G/DL
PROTEIN URINE: NORMAL
RBC # BLD: 4.58 M/UL
RBC # FLD: 14.1 %
RED BLOOD CELLS URINE: 1 /HPF
SODIUM SERPL-SCNC: 136 MMOL/L
SPECIFIC GRAVITY URINE: 1.01
SQUAMOUS EPITHELIAL CELLS: 1 /HPF
UROBILINOGEN URINE: NORMAL
WBC # FLD AUTO: 12.9 K/UL
WHITE BLOOD CELLS URINE: 4 /HPF

## 2022-10-25 NOTE — PHYSICAL EXAM
[Normal] : the left mastoid was normal [Shaw Test Lateralizes To Right] : tone lateralization to the right [de-identified] : n [de-identified] : nontender to palpation [FreeTextEntry1] : open sockets on left side DISCHARGE

## 2022-10-28 ENCOUNTER — RX RENEWAL (OUTPATIENT)
Age: 82
End: 2022-10-28

## 2022-11-14 ENCOUNTER — NON-APPOINTMENT (OUTPATIENT)
Age: 82
End: 2022-11-14

## 2022-12-13 ENCOUNTER — RX RENEWAL (OUTPATIENT)
Age: 82
End: 2022-12-13

## 2022-12-19 ENCOUNTER — RX RENEWAL (OUTPATIENT)
Age: 82
End: 2022-12-19

## 2022-12-27 RX ORDER — HYALURONATE SOD, CROSS-LINKED 30 MG/3 ML
30 SYRINGE (ML) INTRAARTICULAR
Qty: 1 | Refills: 0 | Status: ACTIVE | OUTPATIENT
Start: 2022-11-14

## 2022-12-29 ENCOUNTER — RX RENEWAL (OUTPATIENT)
Age: 82
End: 2022-12-29

## 2022-12-29 RX ORDER — OMEPRAZOLE 40 MG/1
40 CAPSULE, DELAYED RELEASE ORAL
Qty: 90 | Refills: 1 | Status: DISCONTINUED | COMMUNITY
Start: 2022-10-18 | End: 2022-12-29

## 2022-12-29 RX ORDER — FAMOTIDINE 40 MG/1
40 TABLET, FILM COATED ORAL
Qty: 180 | Refills: 0 | Status: DISCONTINUED | COMMUNITY
Start: 2022-10-01 | End: 2022-12-29

## 2022-12-29 RX ORDER — ATORVASTATIN CALCIUM 40 MG/1
40 TABLET, FILM COATED ORAL DAILY
Refills: 0 | Status: DISCONTINUED | COMMUNITY
Start: 2020-04-14 | End: 2022-12-29

## 2023-01-01 NOTE — HISTORY OF PRESENT ILLNESS
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 Pt presents today for the results of her CT soft tissue neck. Pt reports that she has hearing loss in left ear. Pt added that she has holes from teeth removal, that hasn't closed up yet. Pt has numbness in her arms. Last hearing test was years ago. Denies left otalgia, ear discharge.  no

## 2023-01-10 ENCOUNTER — APPOINTMENT (OUTPATIENT)
Dept: ORTHOPEDIC SURGERY | Facility: CLINIC | Age: 83
End: 2023-01-10
Payer: MEDICARE

## 2023-01-10 VITALS — SYSTOLIC BLOOD PRESSURE: 144 MMHG | HEART RATE: 73 BPM | DIASTOLIC BLOOD PRESSURE: 83 MMHG

## 2023-01-10 DIAGNOSIS — M17.11 UNILATERAL PRIMARY OSTEOARTHRITIS, RIGHT KNEE: ICD-10-CM

## 2023-01-10 PROCEDURE — 99213 OFFICE O/P EST LOW 20 MIN: CPT | Mod: 25

## 2023-01-10 PROCEDURE — 20610 DRAIN/INJ JOINT/BURSA W/O US: CPT

## 2023-01-10 RX ORDER — LIDOCAINE HYDROCHLORIDE 10 MG/ML
1 INJECTION, SOLUTION INFILTRATION; PERINEURAL
Refills: 0 | Status: COMPLETED | OUTPATIENT
Start: 2023-01-10

## 2023-01-10 RX ORDER — HYALURONATE SODIUM, STABILIZED 60 MG/3 ML
60 SYRINGE (ML) INTRAARTICULAR
Refills: 0 | Status: COMPLETED | OUTPATIENT
Start: 2023-01-10

## 2023-01-10 RX ADMIN — Medication 0 MG/3ML: at 00:00

## 2023-01-10 RX ADMIN — LIDOCAINE HYDROCHLORIDE 3 %: 10 INJECTION, SOLUTION EPIDURAL; INFILTRATION; INTRACAUDAL; PERINEURAL at 00:00

## 2023-02-02 ENCOUNTER — NON-APPOINTMENT (OUTPATIENT)
Age: 83
End: 2023-02-02

## 2023-02-02 ENCOUNTER — APPOINTMENT (OUTPATIENT)
Dept: INTERNAL MEDICINE | Facility: CLINIC | Age: 83
End: 2023-02-02
Payer: MEDICARE

## 2023-02-02 VITALS
OXYGEN SATURATION: 87 % | DIASTOLIC BLOOD PRESSURE: 62 MMHG | WEIGHT: 131 LBS | BODY MASS INDEX: 25.58 KG/M2 | SYSTOLIC BLOOD PRESSURE: 112 MMHG | HEART RATE: 71 BPM

## 2023-02-02 DIAGNOSIS — R41.3 OTHER AMNESIA: ICD-10-CM

## 2023-02-02 PROCEDURE — 93000 ELECTROCARDIOGRAM COMPLETE: CPT | Mod: 59

## 2023-02-02 PROCEDURE — 36415 COLL VENOUS BLD VENIPUNCTURE: CPT

## 2023-02-02 PROCEDURE — 99213 OFFICE O/P EST LOW 20 MIN: CPT | Mod: 25

## 2023-02-02 NOTE — ASSESSMENT
[FreeTextEntry1] : 1.  Memory\par Check CBC, CMP, B12, thyroid\par Referred to neurology\par \par 2.  Extra beat\par EKG is sinus with ectopy\par \par Reminded of hematology evaluation\par Recommend CPE in 3 months\par

## 2023-02-02 NOTE — REVIEW OF SYSTEMS
[Fever] : no fever [Chills] : no chills [Night Sweats] : no night sweats [Chest Pain] : no chest pain [Shortness Of Breath] : no shortness of breath [Abdominal Pain] : no abdominal pain [Headache] : no headache [Confusion] : confusion

## 2023-02-02 NOTE — PHYSICAL EXAM
[No Acute Distress] : no acute distress [Well Nourished] : well nourished [Well Developed] : well developed [Well-Appearing] : well-appearing [Thyroid Normal, No Nodules] : the thyroid was normal and there were no nodules present [No Respiratory Distress] : no respiratory distress  [No Accessory Muscle Use] : no accessory muscle use [Clear to Auscultation] : lungs were clear to auscultation bilaterally [Normal Rate] : normal rate  [Regular Rhythm] : with a regular rhythm [No Carotid Bruits] : no carotid bruits [No Edema] : there was no peripheral edema [de-identified] : Extra beats

## 2023-02-02 NOTE — HISTORY OF PRESENT ILLNESS
[FreeTextEntry1] : memory change  [de-identified] : Kiarra is here for follow-up.  She has noticed increased memory changes.  Reports that she is getting more more forgetful.  She would like to see a neurologist.  Last A1c was 6.4%.  No other acute concerns.

## 2023-02-10 DIAGNOSIS — H10.9 UNSPECIFIED CONJUNCTIVITIS: ICD-10-CM

## 2023-02-10 LAB
ALBUMIN SERPL ELPH-MCNC: 4 G/DL
ALP BLD-CCNC: 75 U/L
ALT SERPL-CCNC: 17 U/L
ANION GAP SERPL CALC-SCNC: 13 MMOL/L
AST SERPL-CCNC: 17 U/L
BASOPHILS # BLD AUTO: 0.05 K/UL
BASOPHILS NFR BLD AUTO: 0.5 %
BILIRUB SERPL-MCNC: 0.5 MG/DL
BUN SERPL-MCNC: 17 MG/DL
CALCIUM SERPL-MCNC: 10.4 MG/DL
CHLORIDE SERPL-SCNC: 95 MMOL/L
CO2 SERPL-SCNC: 25 MMOL/L
CREAT SERPL-MCNC: 0.74 MG/DL
EGFR: 82 ML/MIN/1.73M2
EOSINOPHIL # BLD AUTO: 0.14 K/UL
EOSINOPHIL NFR BLD AUTO: 1.3 %
FOLATE SERPL-MCNC: 9.9 NG/ML
GLUCOSE SERPL-MCNC: 146 MG/DL
HBA1C MFR BLD HPLC: 6.4
HCT VFR BLD CALC: 36.8 %
HGB BLD-MCNC: 11.7 G/DL
IMM GRANULOCYTES NFR BLD AUTO: 0.4 %
LYMPHOCYTES # BLD AUTO: 1.74 K/UL
LYMPHOCYTES NFR BLD AUTO: 16.2 %
MAN DIFF?: NORMAL
MCHC RBC-ENTMCNC: 28.1 PG
MCHC RBC-ENTMCNC: 31.8 GM/DL
MCV RBC AUTO: 88.2 FL
MONOCYTES # BLD AUTO: 0.99 K/UL
MONOCYTES NFR BLD AUTO: 9.2 %
NEUTROPHILS # BLD AUTO: 7.78 K/UL
NEUTROPHILS NFR BLD AUTO: 72.4 %
PLATELET # BLD AUTO: 394 K/UL
POTASSIUM SERPL-SCNC: 4.2 MMOL/L
PROT SERPL-MCNC: 6.5 G/DL
RBC # BLD: 4.17 M/UL
RBC # FLD: 14 %
SODIUM SERPL-SCNC: 134 MMOL/L
TSH SERPL-ACNC: 1.9 UIU/ML
VIT B12 SERPL-MCNC: 1695 PG/ML
WBC # FLD AUTO: 10.74 K/UL

## 2023-04-18 ENCOUNTER — APPOINTMENT (OUTPATIENT)
Dept: INTERNAL MEDICINE | Facility: CLINIC | Age: 83
End: 2023-04-18
Payer: MEDICARE

## 2023-04-18 VITALS
RESPIRATION RATE: 12 BRPM | HEIGHT: 60 IN | DIASTOLIC BLOOD PRESSURE: 84 MMHG | HEART RATE: 61 BPM | OXYGEN SATURATION: 98 % | BODY MASS INDEX: 24.35 KG/M2 | WEIGHT: 124 LBS | SYSTOLIC BLOOD PRESSURE: 128 MMHG

## 2023-04-18 PROCEDURE — 99213 OFFICE O/P EST LOW 20 MIN: CPT

## 2023-04-18 RX ORDER — LOSARTAN POTASSIUM 50 MG/1
50 TABLET, FILM COATED ORAL
Refills: 0 | Status: DISCONTINUED | COMMUNITY
End: 2023-04-18

## 2023-04-18 RX ORDER — OMEPRAZOLE 40 MG/1
40 CAPSULE, DELAYED RELEASE ORAL
Qty: 90 | Refills: 1 | Status: DISCONTINUED | COMMUNITY
Start: 2022-10-18 | End: 2023-04-18

## 2023-04-18 NOTE — REVIEW OF SYSTEMS
[Fever] : no fever [Chills] : no chills [Fatigue] : no fatigue [Night Sweats] : no night sweats [Chest Pain] : no chest pain [Palpitations] : no palpitations [Shortness Of Breath] : no shortness of breath [Wheezing] : no wheezing [Cough] : no cough [Abdominal Pain] : no abdominal pain [Nausea] : no nausea [Constipation] : no constipation [Diarrhea] : diarrhea [Vomiting] : no vomiting [Headache] : no headache [Dizziness] : no dizziness

## 2023-04-18 NOTE — ASSESSMENT
[FreeTextEntry1] : 1.  Hypertension\par Losartan 50 mg twice daily, amlodipine 2.5 mg twice daily refill\par Continue metoprolol as well\par \par 2.  Type 2 diabetes.\par We will continue current regimen\par A1c and labs on back\par \par 3.  GERD\par On famotidine 20 mg twice daily\par \par \par Plan for next visit to be CPE

## 2023-04-18 NOTE — HISTORY OF PRESENT ILLNESS
[FreeTextEntry1] : Hypertension, hyperlipidemia, type 2 diabetes [de-identified] : Kiarra Junaid today for follow-up.  She needs medication refills.  She is leaving for [facility] for 3 months.  Last A1c was at the end of February with Dr. Perlman.  It was at goal.  She has no new chest pain, palpitations, shortness breath, or dizziness/lightheadedness.

## 2023-04-24 ENCOUNTER — RX RENEWAL (OUTPATIENT)
Age: 83
End: 2023-04-24

## 2023-04-28 NOTE — ASSESSMENT
[FreeTextEntry1] : 1. COPD exacerbation in setting of URI/CAP\par Resolving\par \par 2. Thrush\par Dramatically improved, mild residual symptoms\par One dose of diflucan to help eradicate\par \par 3. COVID19 swab \par \par 4. Microscopic hematuria\par Resolved on repeat though is smoker\par Culture negative\par Renal/bladder US and will likely need Uro 
n/a

## 2023-10-18 ENCOUNTER — RX RENEWAL (OUTPATIENT)
Age: 83
End: 2023-10-18

## 2023-10-20 ENCOUNTER — NON-APPOINTMENT (OUTPATIENT)
Age: 83
End: 2023-10-20

## 2023-10-20 ENCOUNTER — APPOINTMENT (OUTPATIENT)
Dept: INTERNAL MEDICINE | Facility: CLINIC | Age: 83
End: 2023-10-20
Payer: MEDICARE

## 2023-10-20 VITALS — DIASTOLIC BLOOD PRESSURE: 70 MMHG | SYSTOLIC BLOOD PRESSURE: 118 MMHG

## 2023-10-20 VITALS
DIASTOLIC BLOOD PRESSURE: 74 MMHG | SYSTOLIC BLOOD PRESSURE: 142 MMHG | WEIGHT: 122 LBS | RESPIRATION RATE: 12 BRPM | TEMPERATURE: 97.4 F | HEIGHT: 60 IN | OXYGEN SATURATION: 98 % | BODY MASS INDEX: 23.95 KG/M2 | HEART RATE: 64 BPM

## 2023-10-20 DIAGNOSIS — Z00.00 ENCOUNTER FOR GENERAL ADULT MEDICAL EXAMINATION W/OUT ABNORMAL FINDINGS: ICD-10-CM

## 2023-10-20 DIAGNOSIS — D72.829 ELEVATED WHITE BLOOD CELL COUNT, UNSPECIFIED: ICD-10-CM

## 2023-10-20 PROCEDURE — 36415 COLL VENOUS BLD VENIPUNCTURE: CPT

## 2023-10-20 PROCEDURE — G0439: CPT

## 2023-10-20 PROCEDURE — 93000 ELECTROCARDIOGRAM COMPLETE: CPT

## 2023-10-20 PROCEDURE — 99213 OFFICE O/P EST LOW 20 MIN: CPT | Mod: 25

## 2023-10-20 RX ORDER — LEVOTHYROXINE SODIUM 50 UG/1
50 TABLET ORAL
Refills: 0 | Status: ACTIVE | COMMUNITY

## 2023-10-20 RX ORDER — POLYMYXIN B SULFATE AND TRIMETHOPRIM 10000; 1 [USP'U]/ML; MG/ML
10000-0.1 SOLUTION OPHTHALMIC 4 TIMES DAILY
Qty: 1 | Refills: 0 | Status: DISCONTINUED | COMMUNITY
Start: 2023-02-10 | End: 2023-10-20

## 2023-10-20 RX ORDER — LINAGLIPTIN 5 MG/1
5 TABLET, FILM COATED ORAL
Refills: 0 | Status: DISCONTINUED | COMMUNITY
End: 2023-10-20

## 2023-10-20 RX ORDER — PERPHENAZINE 2 MG/1
2 TABLET ORAL
Refills: 0 | Status: ACTIVE | COMMUNITY
Start: 2023-10-20

## 2023-10-20 RX ORDER — PAROXETINE HYDROCHLORIDE 40 MG/1
40 TABLET, FILM COATED ORAL
Refills: 0 | Status: ACTIVE | COMMUNITY

## 2023-10-20 RX ORDER — AMITRIPTYLINE HYDROCHLORIDE 100 MG/1
100 TABLET, FILM COATED ORAL
Refills: 0 | Status: ACTIVE | COMMUNITY
Start: 2023-10-20

## 2023-10-20 RX ORDER — DAPAGLIFLOZIN 10 MG/1
10 TABLET, FILM COATED ORAL
Refills: 0 | Status: ACTIVE | COMMUNITY
Start: 2023-10-20

## 2023-10-24 DIAGNOSIS — E87.5 HYPERKALEMIA: ICD-10-CM

## 2023-10-24 LAB
25(OH)D3 SERPL-MCNC: 35.5 NG/ML
ALBUMIN SERPL ELPH-MCNC: 4.1 G/DL
ALP BLD-CCNC: 59 U/L
ALT SERPL-CCNC: 14 U/L
ANION GAP SERPL CALC-SCNC: 10 MMOL/L
APPEARANCE: CLEAR
AST SERPL-CCNC: 16 U/L
BACTERIA: NEGATIVE /HPF
BASOPHILS # BLD AUTO: 0.04 K/UL
BASOPHILS NFR BLD AUTO: 0.5 %
BILIRUB SERPL-MCNC: 0.5 MG/DL
BILIRUBIN URINE: NEGATIVE
BLOOD URINE: NEGATIVE
BUN SERPL-MCNC: 25 MG/DL
CALCIUM SERPL-MCNC: 9.1 MG/DL
CAST: 0 /LPF
CHLORIDE SERPL-SCNC: 104 MMOL/L
CHOLEST SERPL-MCNC: 205 MG/DL
CO2 SERPL-SCNC: 23 MMOL/L
COLOR: YELLOW
CREAT SERPL-MCNC: 0.68 MG/DL
CREAT SPEC-SCNC: 34 MG/DL
EGFR: 88 ML/MIN/1.73M2
EOSINOPHIL # BLD AUTO: 0.1 K/UL
EOSINOPHIL NFR BLD AUTO: 1.2 %
EPITHELIAL CELLS: 0 /HPF
ESTIMATED AVERAGE GLUCOSE: 171 MG/DL
FOLATE SERPL-MCNC: >20 NG/ML
GLUCOSE QUALITATIVE U: >=1000 MG/DL
GLUCOSE SERPL-MCNC: 143 MG/DL
HBA1C MFR BLD HPLC: 7.6 %
HCT VFR BLD CALC: 42.5 %
HDLC SERPL-MCNC: 51 MG/DL
HGB BLD-MCNC: 13.6 G/DL
IMM GRANULOCYTES NFR BLD AUTO: 0.4 %
KETONES URINE: NEGATIVE MG/DL
LDLC SERPL CALC-MCNC: 131 MG/DL
LEUKOCYTE ESTERASE URINE: NEGATIVE
LYMPHOCYTES # BLD AUTO: 1.68 K/UL
LYMPHOCYTES NFR BLD AUTO: 20 %
MAN DIFF?: NORMAL
MCHC RBC-ENTMCNC: 28.6 PG
MCHC RBC-ENTMCNC: 32 GM/DL
MCV RBC AUTO: 89.3 FL
MICROALBUMIN 24H UR DL<=1MG/L-MCNC: <1.2 MG/DL
MICROALBUMIN/CREAT 24H UR-RTO: NORMAL MG/G
MICROSCOPIC-UA: NORMAL
MONOCYTES # BLD AUTO: 0.85 K/UL
MONOCYTES NFR BLD AUTO: 10.1 %
NEUTROPHILS # BLD AUTO: 5.72 K/UL
NEUTROPHILS NFR BLD AUTO: 67.8 %
NITRITE URINE: NEGATIVE
NONHDLC SERPL-MCNC: 154 MG/DL
PH URINE: 6.5
PLATELET # BLD AUTO: 365 K/UL
POTASSIUM SERPL-SCNC: 5.6 MMOL/L
PROT SERPL-MCNC: 7 G/DL
PROTEIN URINE: NEGATIVE MG/DL
RBC # BLD: 4.76 M/UL
RBC # FLD: 14.6 %
RED BLOOD CELLS URINE: 0 /HPF
SODIUM SERPL-SCNC: 137 MMOL/L
SPECIFIC GRAVITY URINE: 1.02
T4 FREE SERPL-MCNC: 1.2 NG/DL
TRIGL SERPL-MCNC: 133 MG/DL
TSH SERPL-ACNC: 0.89 UIU/ML
UROBILINOGEN URINE: 0.2 MG/DL
VIT B12 SERPL-MCNC: 824 PG/ML
WBC # FLD AUTO: 8.42 K/UL
WHITE BLOOD CELLS URINE: 0 /HPF

## 2023-10-24 RX ORDER — ATORVASTATIN CALCIUM 20 MG/1
20 TABLET, FILM COATED ORAL
Qty: 90 | Refills: 1 | Status: DISCONTINUED | COMMUNITY
Start: 2022-06-21 | End: 2023-10-24

## 2023-10-26 LAB
ANION GAP SERPL CALC-SCNC: 12 MMOL/L
BUN SERPL-MCNC: 19 MG/DL
CALCIUM SERPL-MCNC: 9.7 MG/DL
CHLORIDE SERPL-SCNC: 103 MMOL/L
CO2 SERPL-SCNC: 24 MMOL/L
CREAT SERPL-MCNC: 0.75 MG/DL
EGFR: 80 ML/MIN/1.73M2
GLUCOSE SERPL-MCNC: 203 MG/DL
POTASSIUM SERPL-SCNC: 4.3 MMOL/L
SODIUM SERPL-SCNC: 139 MMOL/L

## 2023-11-03 ENCOUNTER — RX RENEWAL (OUTPATIENT)
Age: 83
End: 2023-11-03

## 2023-11-30 NOTE — ED ADULT NURSE NOTE - CHIEF COMPLAINT QUOTE
Chronic, unstable. Continues to have intermittent spotting and bleeding between menses. Reports daily use of OCP for the past 2-3 years.      I have high blood pressure

## 2023-12-14 ENCOUNTER — APPOINTMENT (OUTPATIENT)
Dept: INTERNAL MEDICINE | Facility: CLINIC | Age: 83
End: 2023-12-14
Payer: MEDICARE

## 2023-12-14 VITALS
OXYGEN SATURATION: 100 % | SYSTOLIC BLOOD PRESSURE: 141 MMHG | BODY MASS INDEX: 24.15 KG/M2 | HEART RATE: 62 BPM | HEIGHT: 60 IN | WEIGHT: 123 LBS | DIASTOLIC BLOOD PRESSURE: 79 MMHG | RESPIRATION RATE: 12 BRPM

## 2023-12-14 VITALS — SYSTOLIC BLOOD PRESSURE: 126 MMHG | DIASTOLIC BLOOD PRESSURE: 70 MMHG

## 2023-12-14 PROCEDURE — 99214 OFFICE O/P EST MOD 30 MIN: CPT | Mod: 25

## 2023-12-14 PROCEDURE — 36415 COLL VENOUS BLD VENIPUNCTURE: CPT

## 2023-12-14 RX ORDER — METFORMIN HYDROCHLORIDE 500 MG/1
500 TABLET, COATED ORAL TWICE DAILY
Qty: 360 | Refills: 0 | Status: ACTIVE | COMMUNITY
Start: 2021-01-28

## 2023-12-14 NOTE — HISTORY OF PRESENT ILLNESS
[FreeTextEntry1] : Hyperlipidemia [de-identified] : Kiarra is here today for follow-up, since her last visit, she saw her endocrinologist who increased her metformin to twice daily.  She has had no issues with increased dose of her dose of her atorvastatin is currently taking 40 mg twice daily.  She is also worried about her memory.  Find that she is more forgetful and having difficulty remembering.  We had checked her B12 and thyroid last visit.  She will be seeing a neurologist.  No other acute concerns.

## 2023-12-14 NOTE — REVIEW OF SYSTEMS
[Fever] : no fever [Chills] : no chills [Fatigue] : no fatigue [Night Sweats] : no night sweats [Vision Problems] : no vision problems [Chest Pain] : no chest pain [Palpitations] : no palpitations [Shortness Of Breath] : no shortness of breath [Abdominal Pain] : no abdominal pain [Headache] : no headache [Memory Loss] : memory loss

## 2023-12-14 NOTE — ASSESSMENT
[FreeTextEntry1] : 1. HTN Well controlled Continue amlodipine, losartan, and metoprolol  2. T2DM Metformin is now 500 twice daily.  Followed by endocrine.  3. HLD Check lipids, LFTs on atorvastatin 40 mg twice daily  4. Hypothyroidism  5.  Memory change Has neurology consultation scheduled  F/U 3 months

## 2023-12-15 LAB
ALBUMIN SERPL ELPH-MCNC: 4.3 G/DL
ALP BLD-CCNC: 68 U/L
ALT SERPL-CCNC: 19 U/L
ANION GAP SERPL CALC-SCNC: 13 MMOL/L
AST SERPL-CCNC: 19 U/L
BILIRUB SERPL-MCNC: 0.6 MG/DL
BUN SERPL-MCNC: 18 MG/DL
CALCIUM SERPL-MCNC: 9.7 MG/DL
CHLORIDE SERPL-SCNC: 102 MMOL/L
CHOLEST SERPL-MCNC: 170 MG/DL
CO2 SERPL-SCNC: 23 MMOL/L
CREAT SERPL-MCNC: 0.73 MG/DL
EGFR: 83 ML/MIN/1.73M2
GLUCOSE SERPL-MCNC: 154 MG/DL
HDLC SERPL-MCNC: 47 MG/DL
LDLC SERPL CALC-MCNC: 95 MG/DL
NONHDLC SERPL-MCNC: 123 MG/DL
POTASSIUM SERPL-SCNC: 5.4 MMOL/L
PROT SERPL-MCNC: 7.2 G/DL
SODIUM SERPL-SCNC: 137 MMOL/L
TRIGL SERPL-MCNC: 162 MG/DL

## 2023-12-19 LAB
ANION GAP SERPL CALC-SCNC: 13 MMOL/L
BUN SERPL-MCNC: 22 MG/DL
CALCIUM SERPL-MCNC: 9.7 MG/DL
CHLORIDE SERPL-SCNC: 102 MMOL/L
CO2 SERPL-SCNC: 22 MMOL/L
CREAT SERPL-MCNC: 0.77 MG/DL
EGFR: 77 ML/MIN/1.73M2
GLUCOSE SERPL-MCNC: 121 MG/DL
POTASSIUM SERPL-SCNC: 5.2 MMOL/L
SODIUM SERPL-SCNC: 137 MMOL/L

## 2024-01-29 ENCOUNTER — RX RENEWAL (OUTPATIENT)
Age: 84
End: 2024-01-29

## 2024-02-04 ENCOUNTER — RX RENEWAL (OUTPATIENT)
Age: 84
End: 2024-02-04

## 2024-02-08 ENCOUNTER — APPOINTMENT (OUTPATIENT)
Dept: INTERNAL MEDICINE | Facility: CLINIC | Age: 84
End: 2024-02-08
Payer: MEDICARE

## 2024-02-08 VITALS — BODY MASS INDEX: 23.05 KG/M2 | TEMPERATURE: 97.9 F | OXYGEN SATURATION: 97 % | HEART RATE: 80 BPM | WEIGHT: 118 LBS

## 2024-02-08 DIAGNOSIS — J11.1 INFLUENZA DUE TO UNIDENTIFIED INFLUENZA VIRUS WITH OTHER RESPIRATORY MANIFESTATIONS: ICD-10-CM

## 2024-02-08 PROCEDURE — 99213 OFFICE O/P EST LOW 20 MIN: CPT

## 2024-02-08 RX ORDER — EPINEPHRINE 0.3 MG/.3ML
0.3 INJECTION INTRAMUSCULAR
Qty: 1 | Refills: 3 | Status: ACTIVE | COMMUNITY
Start: 2020-06-08 | End: 1900-01-01

## 2024-02-08 NOTE — ASSESSMENT
[FreeTextEntry1] : 1.  Recent influenza Status post Tamiflu and prednisone Exam today with clear lungs.  Resting and ambulatory oxygen saturations were normal at 95% or higher. Advised continue p.o. hydration.  Albuterol up to every 4 hours as needed for wheezing. Return precautions were discussed with the patient

## 2024-02-08 NOTE — REVIEW OF SYSTEMS
[Fever] : no fever [Chills] : no chills [Night Sweats] : no night sweats [Chest Pain] : no chest pain [Palpitations] : no palpitations [Shortness Of Breath] : shortness of breath [Wheezing] : wheezing [Cough] : cough [Abdominal Pain] : no abdominal pain

## 2024-02-08 NOTE — HISTORY OF PRESENT ILLNESS
[FreeTextEntry8] : Kiarra is here today for sick visit.  She was recently diagnosed with the flu.  Initially seen at urgent care and prescribed Tamiflu, prednisone 20 mg x 5 days and albuterol.  She is also on Wixela which she reports she has been using.  She Marva presented yesterday given ongoing shortness of breath.  Repeat chest x-ray was normal.  Her home O2's have been normal.  Today, she reports she is better.  She still has some cough.  The cough is worse at night.  Shortness of breath is better.  She is been using her albuterol up to every 6 hours as needed.  No further fevers or chills.  No sputum production. [Spouse] : spouse

## 2024-03-11 ENCOUNTER — APPOINTMENT (OUTPATIENT)
Dept: OTOLARYNGOLOGY | Facility: CLINIC | Age: 84
End: 2024-03-11
Payer: MEDICARE

## 2024-03-11 VITALS
HEART RATE: 64 BPM | HEIGHT: 60 IN | DIASTOLIC BLOOD PRESSURE: 81 MMHG | BODY MASS INDEX: 23.16 KG/M2 | SYSTOLIC BLOOD PRESSURE: 134 MMHG | WEIGHT: 118 LBS

## 2024-03-11 DIAGNOSIS — R13.12 DYSPHAGIA, OROPHARYNGEAL PHASE: ICD-10-CM

## 2024-03-11 DIAGNOSIS — J34.2 DEVIATED NASAL SEPTUM: ICD-10-CM

## 2024-03-11 DIAGNOSIS — J38.4 EDEMA OF LARYNX: ICD-10-CM

## 2024-03-11 DIAGNOSIS — R63.4 ABNORMAL WEIGHT LOSS: ICD-10-CM

## 2024-03-11 DIAGNOSIS — E86.0 DEHYDRATION: ICD-10-CM

## 2024-03-11 PROCEDURE — 99214 OFFICE O/P EST MOD 30 MIN: CPT | Mod: 25

## 2024-03-11 PROCEDURE — 31575 DIAGNOSTIC LARYNGOSCOPY: CPT

## 2024-03-11 RX ORDER — FLUTICASONE PROPIONATE 50 UG/1
50 SPRAY, METERED NASAL
Qty: 48 | Refills: 3 | Status: ACTIVE | COMMUNITY
Start: 2020-07-04 | End: 1900-01-01

## 2024-03-11 RX ORDER — BUDESONIDE AND FORMOTEROL FUMARATE DIHYDRATE 160; 4.5 UG/1; UG/1
160-4.5 AEROSOL RESPIRATORY (INHALATION)
Refills: 0 | Status: ACTIVE | COMMUNITY

## 2024-03-11 RX ORDER — ASPIRIN 81 MG
81 TABLET, DELAYED RELEASE (ENTERIC COATED) ORAL
Refills: 0 | Status: ACTIVE | COMMUNITY

## 2024-03-11 NOTE — PHYSICAL EXAM
[] : septum deviated to the right [Normal] : lingual tonsils are normal [Midline] : trachea located in midline position [Hearing Loss Left Only] : normal [Hearing Loss Right Only] : normal [FreeTextEntry9] :  cerumen removed via curettage from lateral canal wall [FreeTextEntry8] :  cerumen removed via curettage from lateral canal wall [de-identified] :  mildly inflamed turbinates [de-identified] : no tonsil tissue [de-identified] : dry mouth

## 2024-03-11 NOTE — ASSESSMENT
[FreeTextEntry1] :  Reviewed and reconciled medications, allergies, PMHx, PSHx, SocHx, FMHx.  Pt. 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 Patient presents today stating that she has difficulty swallowing, which sometimes causes her to choke. Pt states that she has more trouble swallowing solids than liquids. Pt states that she has had this problem for a long time, and it has not been particularly worse recently. Pt states that she has been losing weight.   Physical exam: -right ear: cerumen removed via curettage from lateral canal wall -left ear: cerumen removed via curettage from lateral canal wall -deviated septum R>L -mildly inflamed turbinates -no tonsil tissue -dry mouth  ENT Procedure: Flexible laryngoscopy -mild edema of the postcricoid -some mucus at the BOT in the vallecular -vocal cord move normally -no pooling  Plan: -Continue Flonase - 2 sprays bilaterally QD, spray laterally. -Ordered modified barium swallow -FU with results from modified barium swallow

## 2024-03-11 NOTE — HISTORY OF PRESENT ILLNESS
[de-identified] : Pt. 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 Patient presents today stating that she has difficulty swallowing, which sometimes causes her to choke. Pt states that she has more trouble swallowing solids than liquids. Pt states that she has had this problem for a long time, and it has not been particularly worse recently. Pt states that she has been losing weight.

## 2024-03-11 NOTE — ADDENDUM
[FreeTextEntry1] :  Documented by Fer Lam acting as scribe for Dr. Johns on 03/11/2024. All Medical record entries made by the Scribe were at my, Dr. Johns, direction and personally dictated by me on 03/11/2024 . I have reviewed the chart and agree that the record accurately reflects my personal performance of the history, physical exam, assessment and plan. I have also personally directed, reviewed, and agreed with the discharge instructions.

## 2024-03-11 NOTE — PROCEDURE
[Complicated Symptoms] : complicated symptoms requiring more thorough examination than provided by mirror [de-identified] :  Procedure: Flexible Fiberoptic Laryngoscopy: Risks, benefits, and alternatives of flexible endoscopy were explained to the patient. The patient gave oral consent to proceed. The flexible scope was inserted into the right nasal cavity and advanced towards the nasopharynx. Visualized mucosa over the turbinates and septum were as described above. The nasopharynx was clear. Oropharyngeal walls were symmetric and mobile without lesion, mass, or edema. Hypopharynx was also without lesion or edema. Larynx was mobile without lesions. Supraglottic structures were free of mass and asymmetry, but mild edema of the postcricoid. True vocal folds were white without mass or lesion. Base of tongue was within normal limits, but with mucus in the vallecular. -mild edema of the postcricoid -some mucus at the BOT in the vallecular -vocal cord move normally -no pooling

## 2024-03-19 ENCOUNTER — APPOINTMENT (OUTPATIENT)
Dept: INTERNAL MEDICINE | Facility: CLINIC | Age: 84
End: 2024-03-19
Payer: MEDICARE

## 2024-03-19 VITALS
OXYGEN SATURATION: 98 % | HEART RATE: 76 BPM | DIASTOLIC BLOOD PRESSURE: 81 MMHG | BODY MASS INDEX: 23.05 KG/M2 | SYSTOLIC BLOOD PRESSURE: 125 MMHG | WEIGHT: 118 LBS

## 2024-03-19 VITALS — DIASTOLIC BLOOD PRESSURE: 80 MMHG | SYSTOLIC BLOOD PRESSURE: 126 MMHG

## 2024-03-19 DIAGNOSIS — E78.5 HYPERLIPIDEMIA, UNSPECIFIED: ICD-10-CM

## 2024-03-19 DIAGNOSIS — E03.9 HYPOTHYROIDISM, UNSPECIFIED: ICD-10-CM

## 2024-03-19 DIAGNOSIS — I25.10 ATHEROSCLEROTIC HEART DISEASE OF NATIVE CORONARY ARTERY W/OUT ANGINA PECTORIS: ICD-10-CM

## 2024-03-19 DIAGNOSIS — R79.89 OTHER SPECIFIED ABNORMAL FINDINGS OF BLOOD CHEMISTRY: ICD-10-CM

## 2024-03-19 PROCEDURE — G2211 COMPLEX E/M VISIT ADD ON: CPT

## 2024-03-19 PROCEDURE — 99214 OFFICE O/P EST MOD 30 MIN: CPT

## 2024-03-19 PROCEDURE — 36415 COLL VENOUS BLD VENIPUNCTURE: CPT

## 2024-03-19 RX ORDER — LOSARTAN POTASSIUM 50 MG/1
50 TABLET, FILM COATED ORAL
Qty: 180 | Refills: 1 | Status: ACTIVE | COMMUNITY
Start: 2020-07-18 | End: 1900-01-01

## 2024-03-19 RX ORDER — FAMOTIDINE 20 MG/1
20 TABLET, FILM COATED ORAL TWICE DAILY
Qty: 180 | Refills: 1 | Status: ACTIVE | COMMUNITY
Start: 2023-04-18 | End: 1900-01-01

## 2024-03-19 RX ORDER — ATORVASTATIN CALCIUM 20 MG/1
20 TABLET, FILM COATED ORAL
Qty: 90 | Refills: 1 | Status: ACTIVE | COMMUNITY
Start: 2024-03-19 | End: 1900-01-01

## 2024-03-19 RX ORDER — AMLODIPINE BESYLATE 2.5 MG/1
2.5 TABLET ORAL
Qty: 90 | Refills: 1 | Status: DISCONTINUED | COMMUNITY
Start: 2020-05-13 | End: 2024-03-19

## 2024-03-19 RX ORDER — ATORVASTATIN CALCIUM 40 MG/1
40 TABLET, FILM COATED ORAL
Qty: 90 | Refills: 1 | Status: ACTIVE | COMMUNITY
Start: 2023-04-18 | End: 1900-01-01

## 2024-03-19 RX ORDER — METOPROLOL SUCCINATE 100 MG/1
100 TABLET, EXTENDED RELEASE ORAL DAILY
Qty: 90 | Refills: 0 | Status: ACTIVE | COMMUNITY
Start: 2020-06-10

## 2024-03-19 NOTE — HISTORY OF PRESENT ILLNESS
[FreeTextEntry1] : HTN, HLD, T2DM,  [de-identified] : Kiarra is here today for follow-up.  She has been feeling well overall. She has stopped her amlodipine approximately 3-4 months ago. Most BPs at home normal. She will occasionally hit 150 systolic, but this usually improves with deep breathing techniques. She is also taking 40 and 20mg atorvastatin daily. No other acute concerns.

## 2024-03-19 NOTE — PHYSICAL EXAM
[No Acute Distress] : no acute distress [Well Nourished] : well nourished [Well Developed] : well developed [Well-Appearing] : well-appearing [Normal Sclera/Conjunctiva] : normal sclera/conjunctiva [Thyroid Normal, No Nodules] : the thyroid was normal and there were no nodules present [No Respiratory Distress] : no respiratory distress  [No Accessory Muscle Use] : no accessory muscle use [Clear to Auscultation] : lungs were clear to auscultation bilaterally [Normal Rate] : normal rate  [Regular Rhythm] : with a regular rhythm [Normal S1, S2] : normal S1 and S2 [No Murmur] : no murmur heard [No Carotid Bruits] : no carotid bruits [No Edema] : there was no peripheral edema

## 2024-03-19 NOTE — REVIEW OF SYSTEMS
[Fever] : no fever [Chills] : no chills [Night Sweats] : no night sweats [Chest Pain] : no chest pain [Palpitations] : no palpitations [Lower Ext Edema] : no lower extremity edema [Shortness Of Breath] : no shortness of breath [Wheezing] : no wheezing [Cough] : no cough [Abdominal Pain] : no abdominal pain [Nausea] : no nausea [Constipation] : no constipation [Diarrhea] : diarrhea [Vomiting] : no vomiting [Dysuria] : no dysuria [Hematuria] : no hematuria [Headache] : no headache [Dizziness] : no dizziness

## 2024-03-19 NOTE — ASSESSMENT
[FreeTextEntry1] : 1. HTN Continue losartan 50mg, metoprolol 100mg qd BP well controlled Has been off amlodipine so will not resume currently Call if BPs consistently high  2. HLD Recheck CMP, lipid profile currently she is taking 40mg and 20mg qd  3. T2DM On metformin, Farxiga A1c today  4. Hypothyroidism TSH, T4  Levothyroxine 50mcg  F/U 3 months

## 2024-03-26 ENCOUNTER — OUTPATIENT (OUTPATIENT)
Dept: OUTPATIENT SERVICES | Facility: HOSPITAL | Age: 84
LOS: 1 days | End: 2024-03-26
Payer: MEDICARE

## 2024-03-26 ENCOUNTER — NON-APPOINTMENT (OUTPATIENT)
Age: 84
End: 2024-03-26

## 2024-03-26 DIAGNOSIS — R13.12 DYSPHAGIA, OROPHARYNGEAL PHASE: ICD-10-CM

## 2024-03-26 PROCEDURE — 74230 X-RAY XM SWLNG FUNCJ C+: CPT | Mod: 26

## 2024-03-26 PROCEDURE — 74230 X-RAY XM SWLNG FUNCJ C+: CPT

## 2024-03-29 LAB
ALBUMIN SERPL ELPH-MCNC: 4.2 G/DL
ALP BLD-CCNC: 67 U/L
ALT SERPL-CCNC: 31 U/L
ANION GAP SERPL CALC-SCNC: 13 MMOL/L
AST SERPL-CCNC: 29 U/L
BASOPHILS # BLD AUTO: 0.04 K/UL
BASOPHILS NFR BLD AUTO: 0.3 %
BILIRUB SERPL-MCNC: 0.7 MG/DL
BUN SERPL-MCNC: 15 MG/DL
CALCIUM SERPL-MCNC: 9.2 MG/DL
CHLORIDE SERPL-SCNC: 102 MMOL/L
CHOLEST SERPL-MCNC: 126 MG/DL
CO2 SERPL-SCNC: 25 MMOL/L
CREAT SERPL-MCNC: 0.66 MG/DL
EGFR: 88 ML/MIN/1.73M2
EOSINOPHIL # BLD AUTO: 0.16 K/UL
EOSINOPHIL NFR BLD AUTO: 1.4 %
ESTIMATED AVERAGE GLUCOSE: 183 MG/DL
GLUCOSE SERPL-MCNC: 198 MG/DL
HBA1C MFR BLD HPLC: 8 %
HCT VFR BLD CALC: 43.4 %
HDLC SERPL-MCNC: 41 MG/DL
HGB BLD-MCNC: 13.8 G/DL
IMM GRANULOCYTES NFR BLD AUTO: 0.4 %
LDLC SERPL CALC-MCNC: 64 MG/DL
LYMPHOCYTES # BLD AUTO: 1.64 K/UL
LYMPHOCYTES NFR BLD AUTO: 14 %
MAN DIFF?: NORMAL
MCHC RBC-ENTMCNC: 28.5 PG
MCHC RBC-ENTMCNC: 31.8 GM/DL
MCV RBC AUTO: 89.5 FL
MONOCYTES # BLD AUTO: 0.85 K/UL
MONOCYTES NFR BLD AUTO: 7.3 %
NEUTROPHILS # BLD AUTO: 8.95 K/UL
NEUTROPHILS NFR BLD AUTO: 76.6 %
NONHDLC SERPL-MCNC: 85 MG/DL
PLATELET # BLD AUTO: 297 K/UL
POTASSIUM SERPL-SCNC: 3.8 MMOL/L
PROT SERPL-MCNC: 6.7 G/DL
RBC # BLD: 4.85 M/UL
RBC # FLD: 15.8 %
SODIUM SERPL-SCNC: 140 MMOL/L
T4 FREE SERPL-MCNC: 1.4 NG/DL
TRIGL SERPL-MCNC: 115 MG/DL
TSH SERPL-ACNC: 2.09 UIU/ML
WBC # FLD AUTO: 11.69 K/UL

## 2024-04-02 ENCOUNTER — APPOINTMENT (OUTPATIENT)
Dept: OTOLARYNGOLOGY | Facility: CLINIC | Age: 84
End: 2024-04-02
Payer: MEDICARE

## 2024-04-02 VITALS
HEIGHT: 60 IN | SYSTOLIC BLOOD PRESSURE: 123 MMHG | WEIGHT: 118 LBS | BODY MASS INDEX: 23.16 KG/M2 | HEART RATE: 73 BPM | DIASTOLIC BLOOD PRESSURE: 75 MMHG

## 2024-04-02 DIAGNOSIS — R49.0 DYSPHONIA: ICD-10-CM

## 2024-04-02 DIAGNOSIS — J31.0 CHRONIC RHINITIS: ICD-10-CM

## 2024-04-02 DIAGNOSIS — R68.2 DRY MOUTH, UNSPECIFIED: ICD-10-CM

## 2024-04-02 DIAGNOSIS — K21.9 GASTRO-ESOPHAGEAL REFLUX DISEASE W/OUT ESOPHAGITIS: ICD-10-CM

## 2024-04-02 DIAGNOSIS — Z85.819 PERSONAL HISTORY OF MALIGNANT NEOPLASM OF UNSPECIFIED SITE OF LIP, ORAL CAVITY, AND PHARYNX: ICD-10-CM

## 2024-04-02 PROCEDURE — 99214 OFFICE O/P EST MOD 30 MIN: CPT

## 2024-04-02 NOTE — ADDENDUM
[FreeTextEntry1] :  Documented by Fer Lam acting as scribe for Dr. Johns on 04/02/2024. All Medical record entries made by the Scribe were at my, Dr. Johsn, direction and personally dictated by me on 04/02/2024 . I have reviewed the chart and agree that the record accurately reflects my personal performance of the history, physical exam, assessment and plan. I have also personally directed, reviewed, and agreed with the discharge instructions.

## 2024-04-02 NOTE — ASSESSMENT
[FreeTextEntry1] :  Reviewed and reconciled medications, allergies, PMHx, PSHx, SocHx, FMHx.  Pt. 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  Modified barium swallow 3/26/24: -food/liquid goes into the throat but it does not clear -thin liquids enter the airway- larynx area  Plan: -Eat soft foods -Avoid dry food -Take small bites and drink frequently in small sips while eating to keep the mouth moist -Consider using Mouth Kote, Biotene toothpaste/mouthwash, ACT gum/lozenge, and Derrick-stir mouth solution -Greater than 50% of interaction spent discussing results and treatment -FU as needed

## 2024-04-02 NOTE — HISTORY OF PRESENT ILLNESS
[de-identified] : Pt. 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

## 2024-04-11 NOTE — ED ADULT TRIAGE NOTE - AS HEIGHT TYPE
What Type Of Note Output Would You Prefer (Optional)?: Bullet Format
How Severe Is Your Acne?: moderate
Is This A New Presentation, Or A Follow-Up?: Acne
stated

## 2024-04-29 ENCOUNTER — APPOINTMENT (OUTPATIENT)
Dept: INTERNAL MEDICINE | Facility: CLINIC | Age: 84
End: 2024-04-29
Payer: MEDICARE

## 2024-04-29 VITALS — DIASTOLIC BLOOD PRESSURE: 80 MMHG | SYSTOLIC BLOOD PRESSURE: 128 MMHG

## 2024-04-29 VITALS — HEART RATE: 81 BPM | DIASTOLIC BLOOD PRESSURE: 78 MMHG | OXYGEN SATURATION: 96 % | SYSTOLIC BLOOD PRESSURE: 148 MMHG

## 2024-04-29 PROCEDURE — 99214 OFFICE O/P EST MOD 30 MIN: CPT

## 2024-04-29 PROCEDURE — G2211 COMPLEX E/M VISIT ADD ON: CPT

## 2024-04-29 RX ORDER — AMLODIPINE BESYLATE 2.5 MG/1
2.5 TABLET ORAL TWICE DAILY
Qty: 180 | Refills: 0 | Status: ACTIVE | COMMUNITY
Start: 2024-04-29 | End: 1900-01-01

## 2024-04-29 NOTE — HISTORY OF PRESENT ILLNESS
[FreeTextEntry8] : Kiarra is here today with concern about blood pressure.  Reports for the last several days, she took her blood pressure in the morning and it was very high in the 170s to 190s systolic.  Within a few hours it had come back down to the 140s to 150s.  She felt well and was otherwise asymptomatic.  She has been very anxious regarding her recent travel to [facility].  No other acute concerns. [Spouse] : spouse

## 2024-04-29 NOTE — ASSESSMENT
[FreeTextEntry1] : 1. HTN Continue losartan 50mg, metoprolol 100mg qd Needs double check amlodipine dose.   Had previously been on 2.5, 5.  Unsure when she is taking right now. Will call back later today and potentially add back low-dose amlodipine   2. HLD Atorvastatin 60 mg daily  3. T2DM On metformin, Farxiga Needs to double check metformin dose-I have 1000 twice daily, though she only has 500 twice daily which may explain the A1c rise  4. Hypothyroidism Levothyroxine 50mcg  To call back with a list of her medications that she is taking them currently right now at home

## 2024-05-02 ENCOUNTER — APPOINTMENT (OUTPATIENT)
Dept: INTERNAL MEDICINE | Facility: CLINIC | Age: 84
End: 2024-05-02
Payer: MEDICARE

## 2024-05-02 VITALS
RESPIRATION RATE: 12 BRPM | WEIGHT: 123 LBS | HEIGHT: 60 IN | HEART RATE: 71 BPM | DIASTOLIC BLOOD PRESSURE: 80 MMHG | BODY MASS INDEX: 24.15 KG/M2 | SYSTOLIC BLOOD PRESSURE: 140 MMHG | OXYGEN SATURATION: 99 %

## 2024-05-02 VITALS — SYSTOLIC BLOOD PRESSURE: 124 MMHG | DIASTOLIC BLOOD PRESSURE: 80 MMHG

## 2024-05-02 DIAGNOSIS — I10 ESSENTIAL (PRIMARY) HYPERTENSION: ICD-10-CM

## 2024-05-02 DIAGNOSIS — E11.9 TYPE 2 DIABETES MELLITUS W/OUT COMPLICATIONS: ICD-10-CM

## 2024-05-02 DIAGNOSIS — R68.81 EARLY SATIETY: ICD-10-CM

## 2024-05-02 PROCEDURE — 99214 OFFICE O/P EST MOD 30 MIN: CPT

## 2024-05-02 NOTE — HISTORY OF PRESENT ILLNESS
[FreeTextEntry1] : Blood pressure check [de-identified] : Patient with history of hypertension, coronary artery disease, hyperlipidemia presents to office for repeat BP check .  During her last office visit an additional amlodipine 2.5 mg was prescribed in the p.m. Her blood pressure at times continues to be elevated early in the morning and normalizes by later in the day.  She does state that she is usually smokes 2 cigarettes and has a cup of coffee prior to checking her blood pressure and the more.  She denies any headache dizziness or lightheadedness. Since I last saw the patient in person she appears to have lost some weight and reviewing her records reveals that she is lost approximately 20 pounds over the last 1-1/2 years.  She has had a history of radiation therapy to her oral cavity and had scar tissue requiring ENT intervention.  She does complain of some difficulty swallowing and early satiety.  She was hospitalized in February at [facility] with and saw GI.  She denies any rectal bleeding or melenic stools

## 2024-05-02 NOTE — PHYSICAL EXAM
[No Acute Distress] : no acute distress [Normal Sclera/Conjunctiva] : normal sclera/conjunctiva [Normal Oropharynx] : the oropharynx was normal [No Lymphadenopathy] : no lymphadenopathy [Thyroid Normal, No Nodules] : the thyroid was normal and there were no nodules present [No Abdominal Bruit] : a ~M bruit was not heard ~T in the abdomen [No Edema] : there was no peripheral edema [No Palpable Aorta] : no palpable aorta [Normal] : soft, non-tender, non-distended, no masses palpated, no HSM and normal bowel sounds [Normal Supraclavicular Nodes] : no supraclavicular lymphadenopathy [Normal Posterior Cervical Nodes] : no posterior cervical lymphadenopathy [Normal Anterior Cervical Nodes] : no anterior cervical lymphadenopathy [Normal Inguinal Nodes] : no inguinal lymphadenopathy [No CVA Tenderness] : no CVA  tenderness

## 2024-06-06 NOTE — ED PROVIDER NOTE - CPE EDP CARDIAC NORM
On Prolixin Decanoate [id number]     c/w Zyprexa, Depakote  f/u Valproic Acid level (added on) normal... On Prolixin Decanoate [id number]     - c/w Zyprexodalys Depakote

## 2024-07-25 ENCOUNTER — RX RENEWAL (OUTPATIENT)
Age: 84
End: 2024-07-25

## 2024-08-12 ENCOUNTER — NON-APPOINTMENT (OUTPATIENT)
Age: 84
End: 2024-08-12

## 2024-08-12 ENCOUNTER — APPOINTMENT (OUTPATIENT)
Dept: INTERNAL MEDICINE | Facility: CLINIC | Age: 84
End: 2024-08-12
Payer: MEDICARE

## 2024-08-12 VITALS
HEIGHT: 60 IN | SYSTOLIC BLOOD PRESSURE: 103 MMHG | RESPIRATION RATE: 12 BRPM | WEIGHT: 118 LBS | HEART RATE: 78 BPM | DIASTOLIC BLOOD PRESSURE: 65 MMHG | BODY MASS INDEX: 23.16 KG/M2 | OXYGEN SATURATION: 98 %

## 2024-08-12 VITALS — SYSTOLIC BLOOD PRESSURE: 110 MMHG | DIASTOLIC BLOOD PRESSURE: 68 MMHG

## 2024-08-12 DIAGNOSIS — E03.9 HYPOTHYROIDISM, UNSPECIFIED: ICD-10-CM

## 2024-08-12 DIAGNOSIS — R60.9 EDEMA, UNSPECIFIED: ICD-10-CM

## 2024-08-12 DIAGNOSIS — I10 ESSENTIAL (PRIMARY) HYPERTENSION: ICD-10-CM

## 2024-08-12 DIAGNOSIS — M79.89 OTHER SPECIFIED SOFT TISSUE DISORDERS: ICD-10-CM

## 2024-08-12 PROCEDURE — 36415 COLL VENOUS BLD VENIPUNCTURE: CPT

## 2024-08-12 PROCEDURE — 93000 ELECTROCARDIOGRAM COMPLETE: CPT

## 2024-08-12 PROCEDURE — 99214 OFFICE O/P EST MOD 30 MIN: CPT

## 2024-08-12 PROCEDURE — G2211 COMPLEX E/M VISIT ADD ON: CPT

## 2024-08-12 NOTE — ASSESSMENT
[FreeTextEntry1] : 1. HTN Continue losartan 50mg daily, metoprolol 100 mg daily Stop amlodipine as it is a returning to her low blood pressures and her edema EKG is RBBB, unchanged  2.  Lower extremity edema Stat lower extremity Stop amlodipine  3.HLD Atorvastatin 60 mg daily  4T2DM On metformin, Farxiga  5. Hypothyroidism Levothyroxine 50mcg

## 2024-08-12 NOTE — REVIEW OF SYSTEMS
[Lower Ext Edema] : lower extremity edema [Fever] : no fever [Chills] : no chills [Night Sweats] : no night sweats [Chest Pain] : no chest pain [Palpitations] : no palpitations [Shortness Of Breath] : no shortness of breath [Wheezing] : no wheezing [Abdominal Pain] : no abdominal pain [Vomiting] : no vomiting [Headache] : no headache [Dizziness] : no dizziness

## 2024-08-12 NOTE — PHYSICAL EXAM
[No Acute Distress] : no acute distress [Well Nourished] : well nourished [Well Developed] : well developed [Well-Appearing] : well-appearing [Normal Sclera/Conjunctiva] : normal sclera/conjunctiva [No Respiratory Distress] : no respiratory distress  [No Accessory Muscle Use] : no accessory muscle use [Clear to Auscultation] : lungs were clear to auscultation bilaterally [Normal Rate] : normal rate  [Regular Rhythm] : with a regular rhythm [Normal S1, S2] : normal S1 and S2 [de-identified] : Mild nonpitting edema bilateral ankles.  Some large prominent varicose veins bilaterally, worse on right greater than left.

## 2024-08-12 NOTE — HISTORY OF PRESENT ILLNESS
[Spouse] : spouse [FreeTextEntry8] : Kiarra is here today after having low blood pressure while in [place].  Reports that in [place], weather was very high and she likely was not drinking enough water.  She went to the doctor there for leg swelling and had a negative Doppler.  That was about 1 month ago.  She then was there recently with systolic blood pressures in the 90s.  She was told to increase some salt intake and stay hydrated.  She reduced her losartan from 50 mg twice daily to 50 mg daily.  She remains on metoprolol and amlodipine.  No chest pain or palpitations.  Some dizziness, though improved.  No other acute concerns.

## 2024-08-13 ENCOUNTER — RX RENEWAL (OUTPATIENT)
Age: 84
End: 2024-08-13

## 2024-08-13 LAB
ALBUMIN SERPL ELPH-MCNC: 4.2 G/DL
ALP BLD-CCNC: 78 U/L
ALT SERPL-CCNC: 14 U/L
ANION GAP SERPL CALC-SCNC: 15 MMOL/L
AST SERPL-CCNC: 16 U/L
BASOPHILS # BLD AUTO: 0.06 K/UL
BASOPHILS NFR BLD AUTO: 0.5 %
BILIRUB SERPL-MCNC: 0.5 MG/DL
BUN SERPL-MCNC: 20 MG/DL
CALCIUM SERPL-MCNC: 9.9 MG/DL
CHLORIDE SERPL-SCNC: 99 MMOL/L
CO2 SERPL-SCNC: 24 MMOL/L
CREAT SERPL-MCNC: 0.77 MG/DL
EGFR: 77 ML/MIN/1.73M2
EOSINOPHIL # BLD AUTO: 0.21 K/UL
EOSINOPHIL NFR BLD AUTO: 1.8 %
GLUCOSE SERPL-MCNC: 180 MG/DL
HCT VFR BLD CALC: 40.1 %
HGB BLD-MCNC: 13 G/DL
IMM GRANULOCYTES NFR BLD AUTO: 0.5 %
LYMPHOCYTES # BLD AUTO: 1.94 K/UL
LYMPHOCYTES NFR BLD AUTO: 16.9 %
MAN DIFF?: NORMAL
MCHC RBC-ENTMCNC: 28.7 PG
MCHC RBC-ENTMCNC: 32.4 GM/DL
MCV RBC AUTO: 88.5 FL
MONOCYTES # BLD AUTO: 1.08 K/UL
MONOCYTES NFR BLD AUTO: 9.4 %
NEUTROPHILS # BLD AUTO: 8.16 K/UL
NEUTROPHILS NFR BLD AUTO: 70.9 %
PLATELET # BLD AUTO: 335 K/UL
POTASSIUM SERPL-SCNC: 3.9 MMOL/L
PROT SERPL-MCNC: 6.6 G/DL
RBC # BLD: 4.53 M/UL
RBC # FLD: 14.2 %
SODIUM SERPL-SCNC: 138 MMOL/L
T4 FREE SERPL-MCNC: 1.2 NG/DL
TSH SERPL-ACNC: 2.09 UIU/ML
WBC # FLD AUTO: 11.51 K/UL

## 2024-09-23 ENCOUNTER — APPOINTMENT (OUTPATIENT)
Dept: INTERNAL MEDICINE | Facility: CLINIC | Age: 84
End: 2024-09-23
Payer: MEDICARE

## 2024-09-23 VITALS — HEART RATE: 80 BPM | OXYGEN SATURATION: 95 % | TEMPERATURE: 98 F | WEIGHT: 116 LBS | BODY MASS INDEX: 22.66 KG/M2

## 2024-09-23 DIAGNOSIS — E03.9 HYPOTHYROIDISM, UNSPECIFIED: ICD-10-CM

## 2024-09-23 DIAGNOSIS — E11.9 TYPE 2 DIABETES MELLITUS W/OUT COMPLICATIONS: ICD-10-CM

## 2024-09-23 DIAGNOSIS — I10 ESSENTIAL (PRIMARY) HYPERTENSION: ICD-10-CM

## 2024-09-23 DIAGNOSIS — G62.9 POLYNEUROPATHY, UNSPECIFIED: ICD-10-CM

## 2024-09-23 DIAGNOSIS — E78.5 HYPERLIPIDEMIA, UNSPECIFIED: ICD-10-CM

## 2024-09-23 PROCEDURE — G2211 COMPLEX E/M VISIT ADD ON: CPT

## 2024-09-23 PROCEDURE — 99213 OFFICE O/P EST LOW 20 MIN: CPT

## 2024-09-23 NOTE — ASSESSMENT
[FreeTextEntry1] : 1. HTN Continue losartan 50mg daily, metoprolol 100 mg daily  2.  Lower extremity edema Resolved  3.HLD Atorvastatin 60 mg daily Due for lipid profile.  Will be seeing endocrinology and is having labs done.  Will forward us results.  4T2DM On metformin, Farxiga  5. Hypothyroidism Levothyroxine 50mcg   Declines flu shot for now Follow-up 3 months

## 2024-09-23 NOTE — HISTORY OF PRESENT ILLNESS
[FreeTextEntry1] : f/u  [de-identified] : Kiarra here today for follow-up.  She saw neurology.  Was diagnosed with neuropathy.  Intention was made to potentially do another EMG/nerve conduction study, though deferred as patient had extreme anxiety when done in the past.  Neuropathy is felt to be secondary to lumbar disc disease and diabetes per her report.  She is waiting MRI of her lower back and is doing physical therapy.  She is not on any medication.  Swelling and dizziness have improved since stopping amlodipine.

## 2024-09-23 NOTE — REVIEW OF SYSTEMS
[Fever] : no fever [Chills] : no chills [Night Sweats] : no night sweats [Chest Pain] : no chest pain [Palpitations] : no palpitations [Lower Ext Edema] : lower extremity edema [Shortness Of Breath] : no shortness of breath [Wheezing] : no wheezing [Abdominal Pain] : no abdominal pain [Vomiting] : no vomiting [Headache] : no headache [Dizziness] : no dizziness

## 2024-09-25 NOTE — ED ADULT TRIAGE NOTE - CHIEF COMPLAINT QUOTE
Form stamped with providers signature, faxed per intake notes and scanned to encounter    Leena Keita ,     
Reason for call:  Form  Reason for Call:  Form, our goal is to have forms completed with 72 hours, however, some forms may require a visit or additional information.    Type of letter, form or note:  medical    Who is the form from?: [facility] (if other please explain)    Where did the form come from: form was faxed in    What clinic location was the form placed at?: [facility] - [phone]    Where the form was placed: team a Box/Folder    What number is listed as a contact on the form?: [phone]       Additional comments: please sign and fax back    Call taken on 10/22/2019 at 4:59 PM by Meredith Jimenez      
I have high blood pressure
N/A

## 2024-10-04 ENCOUNTER — RX RENEWAL (OUTPATIENT)
Age: 84
End: 2024-10-04

## 2024-11-08 ENCOUNTER — APPOINTMENT (OUTPATIENT)
Dept: OTOLARYNGOLOGY | Facility: CLINIC | Age: 84
End: 2024-11-08
Payer: MEDICARE

## 2024-11-08 VITALS
SYSTOLIC BLOOD PRESSURE: 147 MMHG | HEART RATE: 66 BPM | WEIGHT: 118.38 LBS | HEIGHT: 60 IN | BODY MASS INDEX: 23.24 KG/M2 | DIASTOLIC BLOOD PRESSURE: 80 MMHG

## 2024-11-08 DIAGNOSIS — H61.22 IMPACTED CERUMEN, LEFT EAR: ICD-10-CM

## 2024-11-08 DIAGNOSIS — J31.0 CHRONIC RHINITIS: ICD-10-CM

## 2024-11-08 DIAGNOSIS — J34.2 DEVIATED NASAL SEPTUM: ICD-10-CM

## 2024-11-08 DIAGNOSIS — E11.9 TYPE 2 DIABETES MELLITUS W/OUT COMPLICATIONS: ICD-10-CM

## 2024-11-08 DIAGNOSIS — E11.42 TYPE 2 DIABETES MELLITUS WITH DIABETIC POLYNEUROPATHY: ICD-10-CM

## 2024-11-08 DIAGNOSIS — K21.9 GASTRO-ESOPHAGEAL REFLUX DISEASE W/OUT ESOPHAGITIS: ICD-10-CM

## 2024-11-08 PROCEDURE — 99213 OFFICE O/P EST LOW 20 MIN: CPT | Mod: 25

## 2024-11-12 ENCOUNTER — APPOINTMENT (OUTPATIENT)
Dept: INTERNAL MEDICINE | Facility: CLINIC | Age: 84
End: 2024-11-12
Payer: MEDICARE

## 2024-11-12 VITALS
WEIGHT: 116 LBS | BODY MASS INDEX: 22.78 KG/M2 | OXYGEN SATURATION: 97 % | HEIGHT: 60 IN | DIASTOLIC BLOOD PRESSURE: 72 MMHG | HEART RATE: 71 BPM | RESPIRATION RATE: 12 BRPM | SYSTOLIC BLOOD PRESSURE: 170 MMHG

## 2024-11-12 DIAGNOSIS — J40 BRONCHITIS, NOT SPECIFIED AS ACUTE OR CHRONIC: ICD-10-CM

## 2024-11-12 PROCEDURE — G2211 COMPLEX E/M VISIT ADD ON: CPT

## 2024-11-12 PROCEDURE — 99214 OFFICE O/P EST MOD 30 MIN: CPT

## 2024-11-12 RX ORDER — AMLODIPINE BESYLATE 2.5 MG/1
2.5 TABLET ORAL DAILY
Qty: 90 | Refills: 1 | Status: ACTIVE | COMMUNITY
Start: 2024-11-12 | End: 1900-01-01

## 2024-11-12 RX ORDER — AZITHROMYCIN 250 MG/1
250 TABLET, FILM COATED ORAL
Qty: 1 | Refills: 0 | Status: ACTIVE | COMMUNITY
Start: 2024-11-12 | End: 1900-01-01

## 2024-11-12 RX ORDER — CLOPIDOGREL 75 MG/1
75 TABLET, FILM COATED ORAL DAILY
Refills: 0 | Status: ACTIVE | COMMUNITY

## 2024-11-12 RX ORDER — ALBUTEROL SULFATE 90 UG/1
108 (90 BASE) INHALANT RESPIRATORY (INHALATION)
Qty: 1 | Refills: 0 | Status: ACTIVE | COMMUNITY
Start: 2024-11-12 | End: 1900-01-01

## 2024-11-12 RX ORDER — CLOPIDOGREL 75 MG/1
TABLET, FILM COATED ORAL
Refills: 0 | Status: DISCONTINUED | COMMUNITY
End: 2024-11-12

## 2024-11-15 LAB — SARS-COV-2 N GENE NPH QL NAA+PROBE: NOT DETECTED

## 2024-11-15 RX ORDER — LOSARTAN POTASSIUM 50 MG/1
50 TABLET, FILM COATED ORAL
Refills: 0 | Status: ACTIVE | COMMUNITY

## 2024-11-18 ENCOUNTER — NON-APPOINTMENT (OUTPATIENT)
Age: 84
End: 2024-11-18

## 2024-11-18 ENCOUNTER — APPOINTMENT (OUTPATIENT)
Dept: INTERNAL MEDICINE | Facility: CLINIC | Age: 84
End: 2024-11-18

## 2024-11-18 VITALS
SYSTOLIC BLOOD PRESSURE: 128 MMHG | DIASTOLIC BLOOD PRESSURE: 63 MMHG | WEIGHT: 111 LBS | BODY MASS INDEX: 21.79 KG/M2 | OXYGEN SATURATION: 96 % | HEART RATE: 74 BPM | RESPIRATION RATE: 12 BRPM | HEIGHT: 60 IN

## 2024-11-18 DIAGNOSIS — I25.10 ATHEROSCLEROTIC HEART DISEASE OF NATIVE CORONARY ARTERY W/OUT ANGINA PECTORIS: ICD-10-CM

## 2024-11-18 DIAGNOSIS — I65.23 OCCLUSION AND STENOSIS OF BILATERAL CAROTID ARTERIES: ICD-10-CM

## 2024-11-18 DIAGNOSIS — I10 ESSENTIAL (PRIMARY) HYPERTENSION: ICD-10-CM

## 2024-11-18 DIAGNOSIS — E11.9 TYPE 2 DIABETES MELLITUS W/OUT COMPLICATIONS: ICD-10-CM

## 2024-11-18 PROCEDURE — 93000 ELECTROCARDIOGRAM COMPLETE: CPT

## 2024-11-18 PROCEDURE — G2211 COMPLEX E/M VISIT ADD ON: CPT

## 2024-11-18 PROCEDURE — 99214 OFFICE O/P EST MOD 30 MIN: CPT

## 2024-11-19 ENCOUNTER — RX RENEWAL (OUTPATIENT)
Age: 84
End: 2024-11-19

## 2024-11-19 LAB
ALBUMIN SERPL ELPH-MCNC: 4.2 G/DL
ALP BLD-CCNC: 83 U/L
ALT SERPL-CCNC: 17 U/L
ANION GAP SERPL CALC-SCNC: 13 MMOL/L
AST SERPL-CCNC: 18 U/L
BASOPHILS # BLD AUTO: 0.05 K/UL
BASOPHILS NFR BLD AUTO: 0.4 %
BILIRUB SERPL-MCNC: 0.5 MG/DL
BUN SERPL-MCNC: 35 MG/DL
CALCIUM SERPL-MCNC: 10.2 MG/DL
CHLORIDE SERPL-SCNC: 99 MMOL/L
CHOLEST SERPL-MCNC: 143 MG/DL
CO2 SERPL-SCNC: 25 MMOL/L
CREAT SERPL-MCNC: 0.85 MG/DL
EGFR: 68 ML/MIN/1.73M2
EOSINOPHIL # BLD AUTO: 0.27 K/UL
EOSINOPHIL NFR BLD AUTO: 2.3 %
ESTIMATED AVERAGE GLUCOSE: 160 MG/DL
FOLATE SERPL-MCNC: 11.2 NG/ML
GLUCOSE SERPL-MCNC: 100 MG/DL
HBA1C MFR BLD HPLC: 7.2 %
HCT VFR BLD CALC: 45.8 %
HDLC SERPL-MCNC: 41 MG/DL
HGB BLD-MCNC: 14.5 G/DL
IMM GRANULOCYTES NFR BLD AUTO: 0.4 %
LDLC SERPL CALC-MCNC: 80 MG/DL
LYMPHOCYTES # BLD AUTO: 2.12 K/UL
LYMPHOCYTES NFR BLD AUTO: 17.8 %
MAN DIFF?: NORMAL
MCHC RBC-ENTMCNC: 27.8 PG
MCHC RBC-ENTMCNC: 31.7 G/DL
MCV RBC AUTO: 87.7 FL
MONOCYTES # BLD AUTO: 1.09 K/UL
MONOCYTES NFR BLD AUTO: 9.1 %
NEUTROPHILS # BLD AUTO: 8.35 K/UL
NEUTROPHILS NFR BLD AUTO: 70 %
NONHDLC SERPL-MCNC: 102 MG/DL
PLATELET # BLD AUTO: 430 K/UL
POTASSIUM SERPL-SCNC: 4.5 MMOL/L
PROT SERPL-MCNC: 7.5 G/DL
RBC # BLD: 5.22 M/UL
RBC # FLD: 14.5 %
SODIUM SERPL-SCNC: 137 MMOL/L
T4 FREE SERPL-MCNC: 1.5 NG/DL
TRIGL SERPL-MCNC: 123 MG/DL
TSH SERPL-ACNC: 2.1 UIU/ML
VIT B12 SERPL-MCNC: 1359 PG/ML
WBC # FLD AUTO: 11.93 K/UL

## 2024-12-09 ENCOUNTER — RX RENEWAL (OUTPATIENT)
Age: 84
End: 2024-12-09

## 2024-12-17 ENCOUNTER — APPOINTMENT (OUTPATIENT)
Dept: OTOLARYNGOLOGY | Facility: CLINIC | Age: 84
End: 2024-12-17
Payer: MEDICARE

## 2024-12-17 VITALS
WEIGHT: 111 LBS | HEART RATE: 66 BPM | HEIGHT: 60 IN | SYSTOLIC BLOOD PRESSURE: 123 MMHG | DIASTOLIC BLOOD PRESSURE: 73 MMHG | BODY MASS INDEX: 21.79 KG/M2

## 2024-12-17 DIAGNOSIS — K13.79 OTHER LESIONS OF ORAL MUCOSA: ICD-10-CM

## 2024-12-17 DIAGNOSIS — J34.2 DEVIATED NASAL SEPTUM: ICD-10-CM

## 2024-12-17 DIAGNOSIS — J31.0 CHRONIC RHINITIS: ICD-10-CM

## 2024-12-17 DIAGNOSIS — K14.8 OTHER DISEASES OF TONGUE: ICD-10-CM

## 2024-12-17 DIAGNOSIS — M54.2 CERVICALGIA: ICD-10-CM

## 2024-12-17 PROCEDURE — 99213 OFFICE O/P EST LOW 20 MIN: CPT | Mod: 25

## 2024-12-17 PROCEDURE — 31575 DIAGNOSTIC LARYNGOSCOPY: CPT

## 2025-01-07 ENCOUNTER — APPOINTMENT (OUTPATIENT)
Dept: CT IMAGING | Facility: CLINIC | Age: 85
End: 2025-01-07

## 2025-01-27 ENCOUNTER — RX RENEWAL (OUTPATIENT)
Age: 85
End: 2025-01-27

## 2025-02-01 ENCOUNTER — APPOINTMENT (OUTPATIENT)
Dept: CT IMAGING | Facility: CLINIC | Age: 85
End: 2025-02-01

## 2025-02-08 ENCOUNTER — APPOINTMENT (OUTPATIENT)
Dept: MRI IMAGING | Facility: CLINIC | Age: 85
End: 2025-02-08

## 2025-02-08 PROCEDURE — 70540 MRI ORBIT/FACE/NECK W/O DYE: CPT

## 2025-02-21 ENCOUNTER — APPOINTMENT (OUTPATIENT)
Dept: OTOLARYNGOLOGY | Facility: CLINIC | Age: 85
End: 2025-02-21
Payer: MEDICARE

## 2025-02-21 VITALS
DIASTOLIC BLOOD PRESSURE: 79 MMHG | HEART RATE: 73 BPM | HEIGHT: 60 IN | BODY MASS INDEX: 22.38 KG/M2 | SYSTOLIC BLOOD PRESSURE: 138 MMHG | WEIGHT: 114 LBS

## 2025-02-21 DIAGNOSIS — M27.2 INFLAMMATORY CONDITIONS OF JAWS: ICD-10-CM

## 2025-02-21 DIAGNOSIS — T45.7X1A POISONING BY ANTICOAGULANT ANTAGONISTS, VITAMIN K AND OTHER COAGULANTS, ACCIDENTAL (UNINTENTIONAL), INITIAL ENCOUNTER: ICD-10-CM

## 2025-02-21 DIAGNOSIS — K13.79 OTHER LESIONS OF ORAL MUCOSA: ICD-10-CM
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
## 2025-02-21 DIAGNOSIS — J31.0 CHRONIC RHINITIS: ICD-10-CM

## 2025-02-21 DIAGNOSIS — D68.9 POISONING BY ANTICOAGULANT ANTAGONISTS, VITAMIN K AND OTHER COAGULANTS, ACCIDENTAL (UNINTENTIONAL), INITIAL ENCOUNTER: ICD-10-CM

## 2025-02-21 DIAGNOSIS — K14.8 OTHER DISEASES OF TONGUE: ICD-10-CM

## 2025-02-21 DIAGNOSIS — H70.12 CHRONIC MASTOIDITIS, LEFT EAR: ICD-10-CM

## 2025-02-21 PROCEDURE — 99214 OFFICE O/P EST MOD 30 MIN: CPT

## 2025-02-21 RX ORDER — LEVOTHYROXINE SODIUM 0.07 MG/1
75 TABLET ORAL
Refills: 0 | Status: ACTIVE | COMMUNITY

## 2025-02-21 RX ORDER — BACILLUS COAGULANS/INULIN 1B-250 MG
CAPSULE ORAL
Refills: 0 | Status: ACTIVE | COMMUNITY

## 2025-03-03 ENCOUNTER — APPOINTMENT (OUTPATIENT)
Dept: INTERNAL MEDICINE | Facility: CLINIC | Age: 85
End: 2025-03-03
Payer: MEDICARE

## 2025-03-03 VITALS
SYSTOLIC BLOOD PRESSURE: 110 MMHG | HEIGHT: 60 IN | BODY MASS INDEX: 22.58 KG/M2 | HEART RATE: 63 BPM | WEIGHT: 115 LBS | RESPIRATION RATE: 14 BRPM | DIASTOLIC BLOOD PRESSURE: 50 MMHG | OXYGEN SATURATION: 100 %

## 2025-03-03 DIAGNOSIS — E11.9 TYPE 2 DIABETES MELLITUS W/OUT COMPLICATIONS: ICD-10-CM

## 2025-03-03 DIAGNOSIS — I10 ESSENTIAL (PRIMARY) HYPERTENSION: ICD-10-CM

## 2025-03-03 DIAGNOSIS — E03.9 HYPOTHYROIDISM, UNSPECIFIED: ICD-10-CM

## 2025-03-03 DIAGNOSIS — B35.1 TINEA UNGUIUM: ICD-10-CM

## 2025-03-03 PROCEDURE — 99214 OFFICE O/P EST MOD 30 MIN: CPT

## 2025-03-03 PROCEDURE — G2211 COMPLEX E/M VISIT ADD ON: CPT

## 2025-03-03 RX ORDER — CICLOPIROX 71.3 MG/ML
8 SOLUTION TOPICAL
Qty: 1 | Refills: 0 | Status: ACTIVE | COMMUNITY
Start: 2025-03-03 | End: 1900-01-01

## 2025-03-24 ENCOUNTER — NON-APPOINTMENT (OUTPATIENT)
Age: 85
End: 2025-03-24

## 2025-03-24 ENCOUNTER — APPOINTMENT (OUTPATIENT)
Dept: OTOLARYNGOLOGY | Facility: CLINIC | Age: 85
End: 2025-03-24
Payer: MEDICARE

## 2025-03-24 VITALS
SYSTOLIC BLOOD PRESSURE: 145 MMHG | WEIGHT: 115 LBS | DIASTOLIC BLOOD PRESSURE: 77 MMHG | BODY MASS INDEX: 22.58 KG/M2 | HEART RATE: 60 BPM | HEIGHT: 60 IN

## 2025-03-24 DIAGNOSIS — Z85.819 PERSONAL HISTORY OF MALIGNANT NEOPLASM OF UNSPECIFIED SITE OF LIP, ORAL CAVITY, AND PHARYNX: ICD-10-CM

## 2025-03-24 DIAGNOSIS — K14.8 OTHER DISEASES OF TONGUE: ICD-10-CM

## 2025-03-24 DIAGNOSIS — M27.2 INFLAMMATORY CONDITIONS OF JAWS: ICD-10-CM
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
## 2025-03-24 DIAGNOSIS — D68.9 POISONING BY ANTICOAGULANT ANTAGONISTS, VITAMIN K AND OTHER COAGULANTS, ACCIDENTAL (UNINTENTIONAL), INITIAL ENCOUNTER: ICD-10-CM

## 2025-03-24 DIAGNOSIS — Z96.22 MYRINGOTOMY TUBE(S) STATUS: ICD-10-CM

## 2025-03-24 DIAGNOSIS — T45.7X1A POISONING BY ANTICOAGULANT ANTAGONISTS, VITAMIN K AND OTHER COAGULANTS, ACCIDENTAL (UNINTENTIONAL), INITIAL ENCOUNTER: ICD-10-CM

## 2025-03-24 PROCEDURE — 99214 OFFICE O/P EST MOD 30 MIN: CPT

## 2025-03-24 RX ORDER — CIPROFLOXACIN AND DEXAMETHASONE 3; 1 MG/ML; MG/ML
0.3-0.1 SUSPENSION/ DROPS AURICULAR (OTIC) TWICE DAILY
Qty: 1 | Refills: 5 | Status: ACTIVE | COMMUNITY
Start: 2025-03-24 | End: 1900-01-01

## 2025-04-04 ENCOUNTER — APPOINTMENT (OUTPATIENT)
Dept: OTOLARYNGOLOGY | Facility: CLINIC | Age: 85
End: 2025-04-04

## 2025-05-20 ENCOUNTER — APPOINTMENT (OUTPATIENT)
Dept: INTERNAL MEDICINE | Facility: CLINIC | Age: 85
End: 2025-05-20
Payer: MEDICARE

## 2025-05-20 VITALS
BODY MASS INDEX: 23.16 KG/M2 | HEIGHT: 60 IN | HEART RATE: 76 BPM | SYSTOLIC BLOOD PRESSURE: 129 MMHG | WEIGHT: 118 LBS | RESPIRATION RATE: 12 BRPM | OXYGEN SATURATION: 99 % | DIASTOLIC BLOOD PRESSURE: 73 MMHG

## 2025-05-20 VITALS — DIASTOLIC BLOOD PRESSURE: 80 MMHG | SYSTOLIC BLOOD PRESSURE: 120 MMHG

## 2025-05-20 DIAGNOSIS — E03.9 HYPOTHYROIDISM, UNSPECIFIED: ICD-10-CM

## 2025-05-20 DIAGNOSIS — E78.5 HYPERLIPIDEMIA, UNSPECIFIED: ICD-10-CM

## 2025-05-20 DIAGNOSIS — I10 ESSENTIAL (PRIMARY) HYPERTENSION: ICD-10-CM

## 2025-05-20 PROCEDURE — 99214 OFFICE O/P EST MOD 30 MIN: CPT

## 2025-05-20 PROCEDURE — G2211 COMPLEX E/M VISIT ADD ON: CPT

## 2025-06-25 ENCOUNTER — APPOINTMENT (OUTPATIENT)
Dept: OTOLARYNGOLOGY | Facility: CLINIC | Age: 85
End: 2025-06-25
Payer: MEDICARE

## 2025-06-25 VITALS
HEIGHT: 60 IN | BODY MASS INDEX: 23.56 KG/M2 | WEIGHT: 120 LBS | HEART RATE: 62 BPM | SYSTOLIC BLOOD PRESSURE: 104 MMHG | DIASTOLIC BLOOD PRESSURE: 62 MMHG

## 2025-06-25 PROBLEM — H65.92 LEFT SEROUS OTITIS MEDIA, UNSPECIFIED CHRONICITY: Status: ACTIVE | Noted: 2025-06-25

## 2025-06-25 PROBLEM — H93.A2 PULSATILE TINNITUS, LEFT EAR: Status: ACTIVE | Noted: 2025-06-25

## 2025-06-25 PROBLEM — H60.63 CHRONIC NON-INFECTIVE OTITIS EXTERNA OF BOTH EARS, UNSPECIFIED TYPE: Status: ACTIVE | Noted: 2025-06-25

## 2025-06-25 PROCEDURE — 92567 TYMPANOMETRY: CPT

## 2025-06-25 PROCEDURE — 92557 COMPREHENSIVE HEARING TEST: CPT

## 2025-06-25 PROCEDURE — 99214 OFFICE O/P EST MOD 30 MIN: CPT

## 2025-06-25 RX ORDER — TOBRAMYCIN 3 MG/ML
0.3 SOLUTION/ DROPS OPHTHALMIC TWICE DAILY
Qty: 3 | Refills: 3 | Status: ACTIVE | COMMUNITY
Start: 2025-06-25 | End: 1900-01-01

## 2025-07-18 ENCOUNTER — APPOINTMENT (OUTPATIENT)
Dept: OTOLARYNGOLOGY | Facility: CLINIC | Age: 85
End: 2025-07-18
Payer: MEDICARE

## 2025-07-18 VITALS
DIASTOLIC BLOOD PRESSURE: 66 MMHG | HEART RATE: 65 BPM | BODY MASS INDEX: 22.78 KG/M2 | WEIGHT: 116 LBS | SYSTOLIC BLOOD PRESSURE: 116 MMHG | HEIGHT: 60 IN

## 2025-07-18 PROBLEM — H92.22 BLOOD IN LEFT EAR CANAL: Status: ACTIVE | Noted: 2025-07-18

## 2025-07-18 PROBLEM — H90.3 ASYMMETRIC SNHL (SENSORINEURAL HEARING LOSS): Status: ACTIVE | Noted: 2025-07-18

## 2025-07-18 PROBLEM — H93.12 TINNITUS OF LEFT EAR: Status: ACTIVE | Noted: 2025-07-18

## 2025-07-18 PROCEDURE — 99213 OFFICE O/P EST LOW 20 MIN: CPT | Mod: 25

## 2025-07-18 PROCEDURE — 69200 CLEAR OUTER EAR CANAL: CPT | Mod: LT

## 2025-07-28 ENCOUNTER — RX RENEWAL (OUTPATIENT)
Age: 85
End: 2025-07-28

## 2025-08-04 ENCOUNTER — NON-APPOINTMENT (OUTPATIENT)
Age: 85
End: 2025-08-04

## 2025-08-19 ENCOUNTER — APPOINTMENT (OUTPATIENT)
Dept: OTOLARYNGOLOGY | Facility: CLINIC | Age: 85
End: 2025-08-19
Payer: MEDICARE

## 2025-08-19 VITALS
SYSTOLIC BLOOD PRESSURE: 115 MMHG | HEIGHT: 60 IN | BODY MASS INDEX: 22.78 KG/M2 | WEIGHT: 116 LBS | HEART RATE: 58 BPM | DIASTOLIC BLOOD PRESSURE: 73 MMHG

## 2025-08-19 DIAGNOSIS — E11.42 TYPE 2 DIABETES MELLITUS WITH DIABETIC POLYNEUROPATHY: ICD-10-CM

## 2025-08-19 DIAGNOSIS — J34.2 DEVIATED NASAL SEPTUM: ICD-10-CM

## 2025-08-19 DIAGNOSIS — H70.12 CHRONIC MASTOIDITIS, LEFT EAR: ICD-10-CM

## 2025-08-19 DIAGNOSIS — J31.0 CHRONIC RHINITIS: ICD-10-CM

## 2025-08-19 DIAGNOSIS — Z96.22 MYRINGOTOMY TUBE(S) STATUS: ICD-10-CM

## 2025-08-19 PROCEDURE — 99213 OFFICE O/P EST LOW 20 MIN: CPT
